# Patient Record
Sex: FEMALE | Race: WHITE | NOT HISPANIC OR LATINO | Employment: UNEMPLOYED | URBAN - METROPOLITAN AREA
[De-identification: names, ages, dates, MRNs, and addresses within clinical notes are randomized per-mention and may not be internally consistent; named-entity substitution may affect disease eponyms.]

---

## 2023-10-02 PROBLEM — D22.39 MELANOCYTIC NEVI OF OTHER PARTS OF FACE: Status: ACTIVE | Noted: 2019-07-08

## 2023-10-02 PROBLEM — Z85.72 HISTORY OF NON-HODGKIN'S LYMPHOMA: Status: ACTIVE | Noted: 2023-10-02

## 2023-10-02 PROBLEM — I70.0 ATHEROSCLEROSIS OF AORTA (CMS-HCC): Status: ACTIVE | Noted: 2023-10-02

## 2023-10-02 PROBLEM — S76.301A RIGHT HAMSTRING INJURY: Status: ACTIVE | Noted: 2023-10-02

## 2023-10-02 PROBLEM — F51.04 PSYCHOPHYSIOLOGICAL INSOMNIA: Status: ACTIVE | Noted: 2023-10-02

## 2023-10-02 PROBLEM — F41.9 ANXIETY AND DEPRESSION: Status: ACTIVE | Noted: 2023-10-02

## 2023-10-02 PROBLEM — R05.3 CHRONIC COUGH: Status: ACTIVE | Noted: 2023-10-02

## 2023-10-02 PROBLEM — F32.A ANXIETY AND DEPRESSION: Status: ACTIVE | Noted: 2023-10-02

## 2023-10-02 PROBLEM — D22.5 MELANOCYTIC NEVI OF TRUNK: Status: ACTIVE | Noted: 2019-07-08

## 2023-10-02 PROBLEM — M62.838 MUSCLE SPASM: Status: ACTIVE | Noted: 2023-10-02

## 2023-10-02 PROBLEM — R50.9 FEVER: Status: ACTIVE | Noted: 2023-10-02

## 2023-10-02 PROBLEM — I25.10 CORONARY ARTERY CALCIFICATION SEEN ON CT SCAN: Status: ACTIVE | Noted: 2023-10-02

## 2023-10-02 PROBLEM — H61.21 HEARING LOSS DUE TO CERUMEN IMPACTION, RIGHT: Status: ACTIVE | Noted: 2023-10-02

## 2023-10-02 PROBLEM — R03.0 ELEVATED BLOOD PRESSURE READING: Status: ACTIVE | Noted: 2023-10-02

## 2023-10-02 PROBLEM — I35.0 AORTIC VALVE STENOSIS: Status: ACTIVE | Noted: 2023-10-02

## 2023-10-02 PROBLEM — L65.9 NONSCARRING HAIR LOSS, UNSPECIFIED: Status: ACTIVE | Noted: 2019-07-08

## 2023-10-02 PROBLEM — Z15.89 MTHFR MUTATION (METHYLENETETRAHYDROFOLATE REDUCTASE): Status: ACTIVE | Noted: 2023-10-02

## 2023-10-02 PROBLEM — S90.31XA CONTUSION OF RIGHT FOOT: Status: ACTIVE | Noted: 2023-10-02

## 2023-10-02 PROBLEM — Q24.8 LEFT VENTRICULAR OUTFLOW TRACT OBSTRUCTION (HHS-HCC): Status: ACTIVE | Noted: 2023-10-02

## 2023-10-02 PROBLEM — M72.2 PLANTAR FASCIITIS: Status: ACTIVE | Noted: 2023-10-02

## 2023-10-02 PROBLEM — D84.9 IMMUNE DEFICIENCY DISORDER (MULTI): Status: ACTIVE | Noted: 2023-10-02

## 2023-10-02 PROBLEM — R01.1 CARDIAC MURMUR: Status: ACTIVE | Noted: 2023-10-02

## 2023-10-02 PROBLEM — J18.9 PNEUMONIA OF LEFT LOWER LOBE DUE TO INFECTIOUS ORGANISM: Status: ACTIVE | Noted: 2023-10-02

## 2023-10-02 PROBLEM — D18.01 HEMANGIOMA OF SKIN AND SUBCUTANEOUS TISSUE: Status: ACTIVE | Noted: 2019-07-08

## 2023-10-02 PROBLEM — M41.9 SCOLIOSIS: Status: ACTIVE | Noted: 2023-10-02

## 2023-10-02 RX ORDER — L. ACIDOPHILUS/L.BULGARICUS 1MM CELL
TABLET ORAL
COMMUNITY
Start: 2022-12-12

## 2023-10-02 RX ORDER — CALCIUM CARBONATE/VITAMIN D3 500-10/5ML
1 LIQUID (ML) ORAL DAILY
COMMUNITY
Start: 2019-02-01

## 2023-10-02 RX ORDER — CHOLECALCIFEROL (VITAMIN D3) 125 MCG
CAPSULE ORAL
COMMUNITY
Start: 2019-02-01

## 2023-10-02 RX ORDER — ASPIRIN 81 MG/1
TABLET ORAL
COMMUNITY
Start: 2022-12-12 | End: 2023-11-15 | Stop reason: ALTCHOICE

## 2023-10-02 RX ORDER — GLUCOSAM/CHONDRO/HERB 149/HYAL 750-100 MG
1 TABLET ORAL DAILY
COMMUNITY

## 2023-10-02 RX ORDER — PHENOL 1.4 %
1 AEROSOL, SPRAY (ML) MUCOUS MEMBRANE DAILY
COMMUNITY
Start: 2019-02-01

## 2023-10-02 RX ORDER — TURMERIC ROOT EXTRACT 500 MG
TABLET ORAL
COMMUNITY
Start: 2022-12-12

## 2023-10-02 RX ORDER — ASCORBIC ACID 500 MG
1 TABLET ORAL DAILY
COMMUNITY
Start: 2019-02-01

## 2023-10-02 RX ORDER — MIRTAZAPINE 7.5 MG/1
3 TABLET, FILM COATED ORAL NIGHTLY
COMMUNITY
Start: 2023-09-06 | End: 2023-10-22 | Stop reason: SINTOL

## 2023-10-02 RX ORDER — ZINC GLUCONATE 50 MG
50 TABLET ORAL DAILY
COMMUNITY
Start: 2019-02-01

## 2023-10-02 RX ORDER — VIT C/E/ZN/COPPR/LUTEIN/ZEAXAN 250MG-90MG
1 CAPSULE ORAL DAILY
COMMUNITY

## 2023-10-02 RX ORDER — ACETAMINOPHEN 160 MG/5ML
SUSPENSION, ORAL (FINAL DOSE FORM) ORAL
COMMUNITY
Start: 2019-02-01

## 2023-10-02 RX ORDER — LEVOMEFOLATE CALCIUM 15 MG
TABLET ORAL DAILY
COMMUNITY
Start: 2019-02-01

## 2023-10-02 RX ORDER — CHOLECALCIFEROL (VITAMIN D3) 25 MCG
TABLET,CHEWABLE ORAL DAILY
COMMUNITY
Start: 2019-02-01

## 2023-10-03 ENCOUNTER — APPOINTMENT (OUTPATIENT)
Dept: ORTHOPEDIC SURGERY | Facility: HOSPITAL | Age: 68
End: 2023-10-03
Payer: COMMERCIAL

## 2023-10-04 ENCOUNTER — APPOINTMENT (OUTPATIENT)
Dept: BEHAVIORAL HEALTH | Facility: CLINIC | Age: 68
End: 2023-10-04
Payer: COMMERCIAL

## 2023-10-06 ENCOUNTER — ALLIED HEALTH (OUTPATIENT)
Dept: INTEGRATIVE MEDICINE | Facility: CLINIC | Age: 68
End: 2023-10-06
Payer: COMMERCIAL

## 2023-10-06 DIAGNOSIS — M99.02 SOMATIC DYSFUNCTION OF THORACIC REGION: ICD-10-CM

## 2023-10-06 DIAGNOSIS — M99.03 SOMATIC DYSFUNCTION OF LUMBAR REGION: Primary | ICD-10-CM

## 2023-10-06 DIAGNOSIS — M72.2 PLANTAR FASCIITIS, BILATERAL: ICD-10-CM

## 2023-10-06 DIAGNOSIS — M99.01 SOMATIC DYSFUNCTION OF CERVICAL REGION: ICD-10-CM

## 2023-10-06 DIAGNOSIS — M79.18 MYALGIA, OTHER SITE: ICD-10-CM

## 2023-10-06 DIAGNOSIS — M54.2 CERVICALGIA: ICD-10-CM

## 2023-10-06 DIAGNOSIS — M54.16 LUMBAR RADICULOPATHY: ICD-10-CM

## 2023-10-06 DIAGNOSIS — M99.04 SACROILIAC JOINT SOMATIC DYSFUNCTION: ICD-10-CM

## 2023-10-06 PROCEDURE — 99204 OFFICE O/P NEW MOD 45 MIN: CPT | Performed by: CHIROPRACTOR

## 2023-10-06 PROCEDURE — 97140 MANUAL THERAPY 1/> REGIONS: CPT | Performed by: CHIROPRACTOR

## 2023-10-06 PROCEDURE — 98941 CHIROPRACT MANJ 3-4 REGIONS: CPT | Performed by: CHIROPRACTOR

## 2023-10-06 ASSESSMENT — ENCOUNTER SYMPTOMS
CHEST TIGHTNESS: 0
CONFUSION: 0
FATIGUE: 0
FREQUENCY: 0
DIARRHEA: 0
JOINT SWELLING: 0
CONSTIPATION: 0
TROUBLE SWALLOWING: 0
ABDOMINAL PAIN: 0
FEVER: 0

## 2023-10-06 NOTE — PROGRESS NOTES
Subjective   Patient ID: Whitley Cohn is a 68 y.o. female who presents today for full spine complaints, with emphasis on low back, as well as foot pain.    1/25 [facility]    HPI -the patient is presenting today for widespread complaints, with emphasis on her lumbar spine as well as bilateral foot pain.  Her foot pain has been present for about 6 months and has been previously diagnosed with plantar fasciitis affecting the right foot.  This has led to a drop in her activity and she feels like this has led to the cascade of events affecting other parts of her body.  About a week ago she noticed numbness in the left lower leg and foot.  She attempted to go to an orthopedic urgent care but was advised to wait to see if her symptoms self resolved by one of the staff.  She then received chiropractic visit and experience relief related to her left lower extremity numbness for about 1 day.  Symptoms then returned and remained present.  She also expresses that the left lower leg and foot feel very tight.  She feels like there is tension and a knot in the left gluteal muscles.  Her right plantar fascia is very tender and painful. And points to the distal calcaneus as the spot of pain today.  But she does note that this can change.  She is currently doing physical therapy for her plantar fasciitis.   With these additional symptoms in the feet as well as in the lumbar spine, she now experiences extra tension into the neck and upper back and reports additional clicking in the cervical spine with rotating the neck to the right.  Due to the pain she has been less active and has lost significant mount of weight due to this as well as a general lack of appetite secondary to pain.    She has had episodes of plantar fasciitis in the past as well as lower back pain.  But she reports that this current episode is more intense than previous.    Review of Systems   Constitutional:  Negative for fatigue and fever.   HENT:  Negative for trouble  swallowing.    Eyes:  Negative for visual disturbance.   Respiratory:  Negative for chest tightness.    Cardiovascular:  Negative for chest pain and leg swelling.   Gastrointestinal:  Negative for abdominal pain, constipation and diarrhea.   Genitourinary:  Negative for frequency.   Musculoskeletal:  Negative for joint swelling.   Neurological:  Negative for syncope.   Psychiatric/Behavioral:  Negative for confusion.    All other systems reviewed and are negative.    Objective     The patient is alert and oriented x 3. She is in mild to moderate distress.  FHC.  Cranial nerves II-XII are grossly intact. Mil difficulty with transitional movements is observed.  Increased thoracic kyphosis. Elevated left iliac crest.  Leg length is measured at 86 cm on the right and 87 cm on the left.  Presence of levoscoliosis in lumbar spine. Gait is altered due to pain.     Moderate to severe hypertonicity and tenderness is present in the cervical paraspinals, upper trapezius, rhomboids, thoracic paraspinals, lumbar paraspinals, upper gluteals, piriformis, hamstrings, gastrocnemius, plantar fascia bilaterally.  Lumbar and gluteal findings are greater on the left.  Lower leg findings are slightly greater on the left with the exception of the plantar fascia findings.    Segmental joint dysfunction was assessed with motion palpation and is identified in the following areas:  Cervical: C6/C7  Thoracic: T3/4, T4/5  Lumbopelvic: L4/5, L5/S1, Right SI (PI), Left SI      Lumbar spine range of motion is reduced by 20% and painful and extension and left sidebending.      Lower extremity reflexes are diffusely graded 1+.    Kemps test is provocative bilaterally.  Yeomans test is provocative bilaterally.  Nachlas test is negative bilaterally.    Assessment/Plan   The initial presentation revealed several factors contributing to her pain syndrome.  There is likely degenerative changes and the lumbar spine alongside somatic dysfunction of the  lumbar spine and sacroiliac joints contributing to her low back pain and the left lumbar radiculopathy.  She is also dealing with plantar fasciitis bilaterally, noticed more so on the right.  Her leg length inequality is certainly a complicating factor and we will remeasure her legs on subsequent visits to ensure accuracy.  The levoscoliosis present in the lumbar spine is also a complicating factor.  We will implement a trial of care to include various manual therapy techniques based on the patient's tolerance, we will also utilize joint manipulation to tolerance.  When considering her widespread complaints I believe that consult with integrative medicine is warranted and she is already scheduled this.  She may also benefit from podiatric consultation as relates to leg length inequality and the potential use of foot orthoses.    Today's treatment:  Drop-table manipulation applied to the sacroiliac joints.   Low force diversified manipulation applied to the involved thoracic and lumbar segments, and sacroiliac joints.  Low force manipulation to the involved cervical, thoracic and lumbar segments, and sacroiliac joints.      Active release technique applied to the plantar fascia bilaterally.  Mobilization applied to the metatarsals of the left foot.  Mobilization applied to the calcaneus of the right foot.  Manual stretching techniques applied to the hip musculature.  Start time for manual therapy was 3:15 pm and ending time was 3:30 pm    Patient also advised on hip stretching as well as other pertinent lumbar flexibility exercises.  She was also advised on Achilles and gastrocnemius stretching.    Treatment Plan:   The patient and I discussed the risks and benefits of chiropractic care. Based on the patient's subjective complaints along with the examination findings, it is advised that a course of chiropractic treatment be initiated. We will utilize joint manipulation to tolerance.  Manual therapy/Neuromuscular  Re-education will be applied in the form of Integrative Dry Needling, Active Release Techniques to tolerance. Will see patient at a frequency of one visit per week for 5 visits (Visit 1/6).  Consent for care was given both written and orally by the patient. They were advised on the potential for post-treatment soreness. The patient tolerated today's treatment with little or no additional discomfort and was instructed to contact the office for questions or concerns. We will closely monitor their response to care to determine if any changes need to occur, if advanced imaging is needed, or if referral to other providers is appropriate.     This chart note was generated using dictation software, and as such, there may be typographical errors present. Abbreviations may include CS for cervical spine, TS for thoracic spine, and LS for lumbar spine.

## 2023-10-13 ENCOUNTER — APPOINTMENT (OUTPATIENT)
Dept: INTEGRATIVE MEDICINE | Facility: CLINIC | Age: 68
End: 2023-10-13
Payer: COMMERCIAL

## 2023-10-16 ENCOUNTER — TELEPHONE (OUTPATIENT)
Dept: BEHAVIORAL HEALTH | Facility: CLINIC | Age: 68
End: 2023-10-16
Payer: COMMERCIAL

## 2023-10-20 ENCOUNTER — TELEPHONE (OUTPATIENT)
Dept: BEHAVIORAL HEALTH | Facility: CLINIC | Age: 68
End: 2023-10-20
Payer: COMMERCIAL

## 2023-10-20 NOTE — TELEPHONE ENCOUNTER
The answering service received a page from the patient at 4:40pm. The patient said that she had been started on mirtazapine 15mg by Dr. Balbuena for panic attacks and poor sleep. She took 15mg for 3 days starting on 10/16 and then 7.5mg last night. Within 24 hrs of taking the first dose she started to experience side effects such as tongue burning, increased agitation, and nausea. She slept poorly last night and had nightmares which are unusual for her. She endorses mild abdominal pain but denies vomiting or fever. Denies chest pain, SOB, heart palpitations, dizziness, headaches.     She was wondering if it would be okay to discontinue the mirtazapine. I advised her that since she has only been on it for 3 days it would be okay to discontinue tonight. I had spoken to the patient before (in September) when she was tapering off mirtazapine. At that time she was concerned that she had tapered off the mirtazapine too quickly. The patient says she had been off of mirtazapine for a couple of weeks since the beginning of October. Then about one week ago her PCP started her on amitriptyline 25mg for a few days which she has since discontinued.     The patient asked what she should do for the rest of the weekend about her depression and anxiety. She inquired about natural supplements. She is already taking Vitamin D and has tried melatonin in the past with little effect. I advised her that I cannot advise her on natural supplements. I advised her to try stretching, breathing exercises, and outdoor exercise for her mood. I told her I would message Dr. Mullen and Dr. Balbuena about her concerns.

## 2023-10-23 ENCOUNTER — TELEPHONE (OUTPATIENT)
Dept: BEHAVIORAL HEALTH | Facility: CLINIC | Age: 68
End: 2023-10-23

## 2023-10-23 ENCOUNTER — TELEPHONE (OUTPATIENT)
Dept: BEHAVIORAL HEALTH | Facility: CLINIC | Age: 68
End: 2023-10-23
Payer: COMMERCIAL

## 2023-10-23 ENCOUNTER — TELEPHONE (OUTPATIENT)
Dept: OTHER | Age: 68
End: 2023-10-23
Payer: COMMERCIAL

## 2023-10-23 ENCOUNTER — TELEPHONE VISIT (OUTPATIENT)
Dept: BEHAVIORAL HEALTH | Facility: CLINIC | Age: 68
End: 2023-10-23
Payer: COMMERCIAL

## 2023-10-23 DIAGNOSIS — G47.00 INSOMNIA, UNSPECIFIED TYPE: ICD-10-CM

## 2023-10-23 PROCEDURE — 99024 POSTOP FOLLOW-UP VISIT: CPT | Performed by: PSYCHIATRY & NEUROLOGY

## 2023-10-23 NOTE — TELEPHONE ENCOUNTER
Spoke with patient , after 2 other covering providers have spoken with her, regarding sleep/anxiety  concerns. She has decided to stop remeron 15 mg and now wonders if she stopped it too quickly. I have suggested she try to resume remeron at a low dose of 7..5 mg at at night and evaluate for sleep improvement (we did review she complained of SE from mirtazepine in the past but states she thinks she stopped it too quickly). She asks about OTC sleep meds/supplements which I do not suggest at this time. Dr. Mullen recently reviewed melatonin use with her.   She has a psychotherapist and it has been suggested that she see sleep medicine.   I have suggested she discuss further with Dr. Mullen once he returns from vacation.   She is advised that if she needs urgent assistance or feels worse she should report to the emergency room.

## 2023-10-23 NOTE — TELEPHONE ENCOUNTER
Dr. Mullen pt - having trouble while he is on vacation.  Pt talked to Dr. Balbuena yesterday and insists on talking to you today.  She is requesting call back to   [phone]

## 2023-10-23 NOTE — PROGRESS NOTES
Talked with the patient and her  yesterday. She did not tolerate Mirtazapine, so we stopped it. Unable to sleep. Pain seems to be the main trigger. No thoughts of suicide.   Advised to discuss with her PCP pain management, discussed relaxation, medication and sleep hygiene. Also recommended emergency help if any thoughts of suicide and to keep her appointment with Dr Mullen

## 2023-11-06 PROBLEM — M72.2 PLANTAR FASCIAL FIBROMATOSIS: Status: ACTIVE | Noted: 2023-09-27

## 2023-11-06 PROBLEM — F41.9 ANXIETY DISORDER: Status: ACTIVE | Noted: 2023-07-14

## 2023-11-06 PROBLEM — E66.3 OVERWEIGHT: Status: ACTIVE | Noted: 2023-09-27

## 2023-11-06 PROBLEM — Z86.79 H/O: HYPERTENSION: Status: ACTIVE | Noted: 2023-09-27

## 2023-11-06 PROBLEM — C85.90 NON-HODGKIN'S LYMPHOMA (MULTI): Status: ACTIVE | Noted: 2023-07-14

## 2023-11-06 PROBLEM — E66.3 OVERWEIGHT WITH BODY MASS INDEX (BMI) 25.0-29.9: Status: ACTIVE | Noted: 2023-09-27

## 2023-11-06 RX ORDER — DOXYCYCLINE 100 MG/1
TABLET ORAL
COMMUNITY
End: 2023-11-15 | Stop reason: ALTCHOICE

## 2023-11-06 RX ORDER — AMITRIPTYLINE HYDROCHLORIDE 25 MG/1
TABLET, FILM COATED ORAL
COMMUNITY
End: 2023-11-15 | Stop reason: ALTCHOICE

## 2023-11-06 RX ORDER — HYDROXYZINE HYDROCHLORIDE 25 MG/1
TABLET, FILM COATED ORAL
COMMUNITY
End: 2023-11-15 | Stop reason: ALTCHOICE

## 2023-11-06 RX ORDER — TRAMADOL HYDROCHLORIDE 50 MG/1
TABLET ORAL
COMMUNITY
End: 2023-11-15 | Stop reason: ALTCHOICE

## 2023-11-06 RX ORDER — ERYTHROMYCIN 5 MG/G
OINTMENT OPHTHALMIC
COMMUNITY
End: 2023-11-15 | Stop reason: ALTCHOICE

## 2023-11-06 RX ORDER — TRAZODONE HYDROCHLORIDE 50 MG/1
TABLET ORAL
COMMUNITY
End: 2023-11-15 | Stop reason: ALTCHOICE

## 2023-11-06 RX ORDER — MIRTAZAPINE 15 MG/1
TABLET, FILM COATED ORAL
COMMUNITY
Start: 2023-07-24 | End: 2023-11-15 | Stop reason: ALTCHOICE

## 2023-11-06 RX ORDER — BUSPIRONE HYDROCHLORIDE 10 MG/1
TABLET ORAL
COMMUNITY
Start: 2023-07-12 | End: 2023-11-15 | Stop reason: ALTCHOICE

## 2023-11-06 RX ORDER — BUSPIRONE HYDROCHLORIDE 15 MG/1
15 TABLET ORAL 3 TIMES DAILY PRN
COMMUNITY
Start: 2023-06-22 | End: 2023-11-15 | Stop reason: ALTCHOICE

## 2023-11-06 RX ORDER — MIRTAZAPINE 45 MG/1
45 TABLET, FILM COATED ORAL NIGHTLY PRN
COMMUNITY
Start: 2023-06-22 | End: 2023-11-15 | Stop reason: ALTCHOICE

## 2023-11-06 RX ORDER — METHYLPREDNISOLONE 4 MG/1
TABLET ORAL
COMMUNITY
Start: 2023-07-17 | End: 2023-11-15 | Stop reason: ALTCHOICE

## 2023-11-06 RX ORDER — AMOXICILLIN 500 MG/1
2000 TABLET, FILM COATED ORAL ONCE AS NEEDED
COMMUNITY
Start: 2023-03-19 | End: 2023-11-15 | Stop reason: ALTCHOICE

## 2023-11-06 RX ORDER — BUSPIRONE HYDROCHLORIDE 5 MG/1
TABLET ORAL
COMMUNITY
Start: 2023-07-06 | End: 2023-11-15 | Stop reason: ALTCHOICE

## 2023-11-06 RX ORDER — ACYCLOVIR 50 MG/G
OINTMENT TOPICAL
COMMUNITY
Start: 2023-10-20 | End: 2023-11-15 | Stop reason: ALTCHOICE

## 2023-11-13 ENCOUNTER — OFFICE VISIT (OUTPATIENT)
Dept: BEHAVIORAL HEALTH | Facility: CLINIC | Age: 68
End: 2023-11-13
Payer: COMMERCIAL

## 2023-11-13 DIAGNOSIS — F32.A ANXIETY AND DEPRESSION: ICD-10-CM

## 2023-11-13 DIAGNOSIS — F41.9 ANXIETY AND DEPRESSION: ICD-10-CM

## 2023-11-13 PROCEDURE — 99215 OFFICE O/P EST HI 40 MIN: CPT | Performed by: PSYCHIATRY & NEUROLOGY

## 2023-11-13 PROCEDURE — 1125F AMNT PAIN NOTED PAIN PRSNT: CPT | Performed by: PSYCHIATRY & NEUROLOGY

## 2023-11-13 RX ORDER — MIRTAZAPINE 15 MG/1
15 TABLET, FILM COATED ORAL NIGHTLY
Qty: 30 TABLET | Refills: 0 | Status: SHIPPED | OUTPATIENT
Start: 2023-11-13 | End: 2023-11-15 | Stop reason: ALTCHOICE

## 2023-11-13 ASSESSMENT — ENCOUNTER SYMPTOMS: DYSPHORIC MOOD: 1

## 2023-11-13 NOTE — PROGRESS NOTES
Subjective   Patient ID: Whitley Cohn is a 68 y.o. female who presents for No chief complaint on file. Some days I have been okay and some days I have not been okay.    The patient presents alert, fully oriented, recent and remote memory are intact and with language intact. The patient denies any suicidal or homicidal ideation or plans. The patient continues with chronic depression and anxiety. No manic features are evidenced. No hallucinations or paranoid symptoms evidenced. The patient has been having more complications with foot and now has a pinched nerve which she feels has set her back.         Review of Systems   Neurological:         The patient presents alert, fully oriented, recent and remote memory are intact and with language intact. The patient denies any suicidal or homicidal ideation or plans. The patient continues with chronic depression and anxiety. No manic features are evidenced. No hallucinations or paranoid symptoms evidenced. The patient has been having more complications with foot and now has a pinched nerve which she feels has set her back.      Psychiatric/Behavioral:  Positive for dysphoric mood.         The patient presents alert, fully oriented, recent and remote memory are intact and with language intact. The patient denies any suicidal or homicidal ideation or plans. The patient continues with chronic depression and anxiety. No manic features are evidenced. No hallucinations or paranoid symptoms evidenced. The patient has been having more complications with foot and now has a pinched nerve which she feels has set her back.      All other systems reviewed and are negative.      Objective   Physical Exam  Neurological:      General: No focal deficit present.      Mental Status: She is alert and oriented to person, place, and time. Mental status is at baseline.      Comments: The patient presents alert, fully oriented, recent and remote memory are intact and with language intact. The  patient denies any suicidal or homicidal ideation or plans. The patient continues with chronic depression and anxiety. No manic features are evidenced. No hallucinations or paranoid symptoms evidenced. The patient has been having more complications with foot and now has a pinched nerve which she feels has set her back.      Psychiatric:      Comments: The patient presents alert, fully oriented, recent and remote memory are intact and with language intact. The patient denies any suicidal or homicidal ideation or plans. The patient continues with chronic depression and anxiety. No manic features are evidenced. No hallucinations or paranoid symptoms evidenced. The patient has been having more complications with foot and now has a pinched nerve which she feels has set her back.            Lab Review:       Assessment/Plan   The risks, benefits & alternatives to the medications prescribed were explained to you and your support person, your , today. You & your  were able to understand & repeat these risks, benefits & alternatives to this prescribed medication. You and your support persons have agreed to proceed with treatment with the medications discussed based on the conclusion that the benefit outweighs the risks of this treatment regimen: start mirtazapine 15 mg daily at bedtime. Continue with your therapist and follow up in one to two weeks.

## 2023-11-15 ENCOUNTER — PATIENT MESSAGE (OUTPATIENT)
Dept: INTEGRATIVE MEDICINE | Facility: CLINIC | Age: 68
End: 2023-11-15

## 2023-11-15 ENCOUNTER — ALLIED HEALTH (OUTPATIENT)
Dept: INTEGRATIVE MEDICINE | Facility: CLINIC | Age: 68
End: 2023-11-15
Payer: COMMERCIAL

## 2023-11-15 VITALS
SYSTOLIC BLOOD PRESSURE: 132 MMHG | WEIGHT: 153 LBS | BODY MASS INDEX: 24.69 KG/M2 | HEART RATE: 73 BPM | OXYGEN SATURATION: 96 % | DIASTOLIC BLOOD PRESSURE: 79 MMHG

## 2023-11-15 DIAGNOSIS — M72.2 PLANTAR FASCIITIS, BILATERAL: ICD-10-CM

## 2023-11-15 DIAGNOSIS — M54.16 LUMBAR RADICULOPATHY: Primary | ICD-10-CM

## 2023-11-15 DIAGNOSIS — M54.59 OTHER LOW BACK PAIN: ICD-10-CM

## 2023-11-15 DIAGNOSIS — F41.9 ANXIETY AND DEPRESSION: ICD-10-CM

## 2023-11-15 DIAGNOSIS — F32.A ANXIETY AND DEPRESSION: ICD-10-CM

## 2023-11-15 DIAGNOSIS — F51.04 PSYCHOPHYSIOLOGICAL INSOMNIA: ICD-10-CM

## 2023-11-15 DIAGNOSIS — M72.2 PLANTAR FASCIITIS OF RIGHT FOOT: ICD-10-CM

## 2023-11-15 SDOH — SOCIAL STABILITY: SOCIAL NETWORK: I HAVE TROUBLE DOING ALL OF THE FAMILY ACTIVITIES THAT I WANT TO DO: ALWAYS

## 2023-11-15 SDOH — SOCIAL STABILITY: SOCIAL NETWORK: I HAVE TROUBLE DOING ALL OF THE ACTIVITIES WITH FRIENDS THAT I WANT TO DO: ALWAYS

## 2023-11-15 SDOH — SOCIAL STABILITY: SOCIAL NETWORK: PROMIS ABILITY TO PARTICIPATE IN SOCIAL ROLES & ACTIVITIES T-SCORE: 34

## 2023-11-15 SDOH — SOCIAL STABILITY: SOCIAL NETWORK: I HAVE TROUBLE DOING ALL OF MY REGULAR LEISURE ACTIVITIES WITH OTHERS: ALWAYS

## 2023-11-15 SDOH — SOCIAL STABILITY: SOCIAL NETWORK

## 2023-11-15 SDOH — SOCIAL STABILITY: SOCIAL NETWORK: I HAVE TROUBLE DOING ALL OF MY USUAL WORK (INCLUDE WORK AT HOME): USUALLY

## 2023-11-15 ASSESSMENT — PROMIS GLOBAL HEALTH SCALE
CARRYOUT_SOCIAL_ACTIVITIES: GOOD
RATE_AVERAGE_FATIGUE: VERY SEVERE
RATE_GENERAL_HEALTH: EXCELLENT
RATE_MENTAL_HEALTH: VERY GOOD
RATE_PHYSICAL_HEALTH: VERY GOOD
RATE_QUALITY_OF_LIFE: EXCELLENT
RATE_AVERAGE_PAIN: 8
EMOTIONAL_PROBLEMS: OFTEN
RATE_SOCIAL_SATISFACTION: VERY GOOD
CARRYOUT_PHYSICAL_ACTIVITIES: A LITTLE

## 2023-11-15 ASSESSMENT — ENCOUNTER SYMPTOMS
SHORTNESS OF BREATH: 0
WEAKNESS: 0
DYSURIA: 0
SLEEP DISTURBANCE: 1
COUGH: 0
NERVOUS/ANXIOUS: 1
BACK PAIN: 1
FATIGUE: 0
APPETITE CHANGE: 0
HEADACHES: 0
DECREASED CONCENTRATION: 1
DIFFICULTY URINATING: 0
FEVER: 0
STRESS: 1
ARTHRALGIAS: 1
MYALGIAS: 0

## 2023-11-15 NOTE — ASSESSMENT & PLAN NOTE
Suggest that she increase her vitamin d dose  Strongly encouraged her to restart the mirtazapin at bedtime at least for the next several months while we get her mood in better control so that she can work on lifestyle factors. She is not sleeping, may need to do sleep study at some point.   She is on a VERY high dose of l-methylfolate and sometimes this can dause insomnia. Suggest decrease this to 1 mg per day in the setting a good multiple vitamin. Gave her a recommendation.

## 2023-11-15 NOTE — PATIENT INSTRUCTIONS
Try your best to go to the pool two times per week.   Suggest see Christina Griffin, PT for your back pain and plantar fascitis.   Increase vitamin d3 4000 international units    Change L methylfolate to 5 mg with b complex.   Jessica mag glycinate 330 mg at bed time. If not helping with sleep, would increase to 440 mg at bed time  Roll your feet for 30seconds each to start and then work up to at least a minute.   Try acupuncture for the plantar fascitis.   Crocodile pose for ten minutes once per day.   Look into another place to swim daily.   Stop l methylfolate until new dose comes.

## 2023-11-15 NOTE — PROGRESS NOTES
Integrative Medicine Visit:     Subjective   Patient ID: Whitley Cohn is a 68 y.o. female who presents for Stress and low immunity       Stress  Associated symptoms include arthralgias. Pertinent negatives include no chest pain, congestion, coughing, fatigue, fever, headaches, myalgias, rash or weakness.     She is very stressed and in physical and emotional pain.  Struggling with her depresison and anxiety. Working with psychiatry. Began in mid June.  Also having some physical injuries that are related to her stress. Not taking remeron at this time.   Before mid June, she likes to exercise a lot. Walked five miles per day and doing rachelle and in [place] many stressful things happened all at the same time.  It affected her emotionally. She thinks she was holding her muscles tense but cannot walk due to the plantar fasciitis. Now she is healing from the injuries. Has had some help with physical therapy. It got worse with this. Left foot numbness which she was told is from stenosis in her lower back. Has pain in right foot. Hx of scoliosis. Went to PT to [facility]. Went to a chiropractor which made the pain better.   Pinched nerve started October 5 and the plantar fascitis began in July. On a very limited budget.   Has pain in her right foot that is 7/10.  Some back pain which is from lack of exercise.     Has pain in her low back which she thinks causes left foot numbness. It is sore 5/10. It varies from being on the right and the left side.     Depression: tried medication but it was too worried about side effects and did not feel better. Tried mirtazipine for about one month. Not taking. Was recommended to restart this two days ago. Take l methylfolate (methylpro). Lots of family stress. It really upsets her. Depression started in June.   Taking vitamin d 2000 international units  by mouth daily.   Takes magnesium citrate 330 mg in the morning. Takes mag glycinate about 110 mg at bedtime.         CONCERNS:  wants help with her plantar fascitis. Depression, back pain. Insomnia.     PMH:  non hodgkin's lymphoma. In remission.  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  Moved to [facility] 8 years ago to help grand children. Lives with her .     NUTRITION: stopped coffee.     Smoking:  non-smoker    Alcohol use:  none    Exercise:   swim two times per week.     SLEEP: not sleeping well. Between 1-5 hours per day.     STRESS MANAGEMENT: no specific practices now  SUPPORT:     Review of Systems   Constitutional:  Negative for appetite change, fatigue and fever.   HENT:  Negative for congestion and hearing loss.    Eyes:  Negative for visual disturbance.   Respiratory:  Negative for cough and shortness of breath.    Cardiovascular:  Negative for chest pain and leg swelling.   Genitourinary:  Negative for difficulty urinating, dysuria and urgency.   Musculoskeletal:  Positive for arthralgias and back pain. Negative for myalgias.   Skin:  Negative for rash.   Neurological:  Negative for weakness and headaches.   Psychiatric/Behavioral:  Positive for decreased concentration and sleep disturbance. Negative for behavioral problems. The patient is nervous/anxious.         + depressed            Pain:    Objective   /79 (BP Location: Left arm, Patient Position: Sitting, BP Cuff Size: Adult)   Pulse 73   Wt 69.4 kg (153 lb)   SpO2 96%   BMI 24.69 kg/m²       Physical Exam  HENT:      Head: Normocephalic and atraumatic.      Mouth/Throat:      Mouth: Mucous membranes are moist.   Cardiovascular:      Rate and Rhythm: Normal rate.   Pulmonary:      Effort: Pulmonary effort is normal. No respiratory distress.   Musculoskeletal:         General: Deformity (toes very curled in) present. No swelling.      Cervical back: Normal range of motion.   Skin:      General: Skin is dry.      Findings: No rash.   Neurological:      General: No focal deficit present.      Mental Status: She is alert and oriented to person, place, and time.   Psychiatric:      Comments: Flat affect but at the same time appears anxious. Not tearful.                       Assessment/Plan     Problem List Items Addressed This Visit             ICD-10-CM    Anxiety and depression F41.9, F32.A     Suggest that she increase her vitamin d dose  Strongly encouraged her to restart the mirtazapin at bedtime at least for the next several months while we get her mood in better control so that she can work on lifestyle factors. She is not sleeping, may need to do sleep study at some point.   She is on a VERY high dose of l-methylfolate and sometimes this can dause insomnia. Suggest decrease this to 1 mg per day in the setting a good multiple vitamin. Gave her a recommendation.            Psychophysiological insomnia F51.04     Restart mirtazapine and continue follow up with psychiatry.            Lumbar radiculopathy - Primary M54.16     Acupuncture  PT         Relevant Orders    Referral to Physical Therapy    Plantar fasciitis, bilateral M72.2     See PT  Advised soft ball rolling under the foot           Relevant Orders    Referral to [employer]     Other Visit Diagnoses         Codes    Plantar fasciitis of right foot     M72.2    Relevant Orders    Referral to Physical Therapy    Other low back pain     M54.59    Relevant Orders    [facility]                Recommend Follow up in : 2 weeks.     Aleena Cano MD PhD

## 2023-11-16 PROBLEM — M51.369 DEGENERATION OF LUMBAR INTERVERTEBRAL DISC: Status: ACTIVE | Noted: 2023-10-13

## 2023-11-16 PROBLEM — M51.36 DEGENERATION OF LUMBAR INTERVERTEBRAL DISC: Status: ACTIVE | Noted: 2023-10-13

## 2023-11-16 RX ORDER — MIRTAZAPINE 7.5 MG/1
7.5 TABLET, FILM COATED ORAL NIGHTLY
COMMUNITY
Start: 2023-10-23 | End: 2023-11-22 | Stop reason: SDUPTHER

## 2023-11-22 ENCOUNTER — TELEPHONE (OUTPATIENT)
Dept: OTHER | Age: 68
End: 2023-11-22
Payer: COMMERCIAL

## 2023-11-22 ENCOUNTER — TELEMEDICINE (OUTPATIENT)
Dept: BEHAVIORAL HEALTH | Facility: CLINIC | Age: 68
End: 2023-11-22
Payer: COMMERCIAL

## 2023-11-22 DIAGNOSIS — F41.8 ANXIETY WITH OBSESSIONAL FEATURES: ICD-10-CM

## 2023-11-22 DIAGNOSIS — F32.A ANXIETY AND DEPRESSION: ICD-10-CM

## 2023-11-22 DIAGNOSIS — F43.23 ADJUSTMENT DISORDER WITH MIXED ANXIETY AND DEPRESSED MOOD: ICD-10-CM

## 2023-11-22 DIAGNOSIS — F41.9 ANXIETY AND DEPRESSION: ICD-10-CM

## 2023-11-22 PROCEDURE — 99215 OFFICE O/P EST HI 40 MIN: CPT | Performed by: PSYCHIATRY & NEUROLOGY

## 2023-11-22 RX ORDER — MIRTAZAPINE 15 MG/1
15 TABLET, FILM COATED ORAL NIGHTLY
Qty: 30 TABLET | Refills: 2 | Status: SHIPPED | OUTPATIENT
Start: 2023-11-22 | End: 2024-01-02 | Stop reason: SDUPTHER

## 2023-11-22 NOTE — PROGRESS NOTES
Subjective   Patient ID: Whitley Cohn is a 68 y.o. female who presents for No chief complaint on file. I am having panic, fearful and depressed.    The patient is alert, fully oriented, language is intact, and recent and remote memory intact. The patient denies any suicidal or homicidal ideation or plans. The patient presents with depressive features continuing without manic or psychotic symptoms. Thought is conflicted. Judgment or insight are limited. The patient continues to be conflicted about taking medications. The patient reports being depressed and is seeking medications.      Review of Systems   Neurological:         The patient is alert, fully oriented, language is intact, and recent and remote memory intact. The patient denies any suicidal or homicidal ideation or plans. The patient presents with depressive features continuing without manic or psychotic symptoms. Thought is conflicted. Judgment or insight are limited. The patient continues to be conflicted about taking medications. The patient reports being depressed and is seeking medications.     Psychiatric/Behavioral:          The patient is alert, fully oriented, language is intact, and recent and remote memory intact. The patient denies any suicidal or homicidal ideation or plans. The patient presents with depressive features continuing without manic or psychotic symptoms. Thought is conflicted. Judgment or insight are limited. The patient continues to be conflicted about taking medications. The patient reports being depressed and is seeking medications.     All other systems reviewed and are negative.      Objective   Physical Exam  Neurological:      General: No focal deficit present.      Mental Status: She is alert and oriented to person, place, and time. Mental status is at baseline.   Psychiatric:      Comments: The patient is alert, fully oriented, language is intact, and recent and remote memory intact. The patient denies any suicidal or  homicidal ideation or plans. The patient presents with depressive features continuing without manic or psychotic symptoms. Thought is conflicted. Judgment or insight are limited. The patient continues to be conflicted about taking medications. The patient reports being depressed and is seeking medications.           Lab Review:       Assessment/Plan   The risks, benefits & alternatives to the medications prescribed were explained to you and your support person, your , today. You and your support person were able to understand & repeat these risks, benefits & alternatives to this prescribed medication. You and your support person have agreed to proceed with treatment with the medications discussed based on the conclusion that the benefit outweighs the risks of this treatment regimen: increase mirtazapine to 15 mg daily at bedtime and follow up weekly as possible. We are also making a referral to psychology and specifically Dr. North at your request for psychotherapy.

## 2023-11-22 NOTE — TELEPHONE ENCOUNTER
"Caller: pt, [phone]    Can't sleep or relax.  Doesn't feel like medication prescribed is helping at all.  She would like to speak to you at first opportunity, as she feels this is \"URGENT\"  "

## 2023-11-27 ENCOUNTER — APPOINTMENT (OUTPATIENT)
Dept: INTEGRATIVE MEDICINE | Facility: CLINIC | Age: 68
End: 2023-11-27
Payer: COMMERCIAL

## 2023-11-27 ENCOUNTER — TELEMEDICINE (OUTPATIENT)
Dept: BEHAVIORAL HEALTH | Facility: CLINIC | Age: 68
End: 2023-11-27
Payer: COMMERCIAL

## 2023-11-27 DIAGNOSIS — F32.A ANXIETY AND DEPRESSION: ICD-10-CM

## 2023-11-27 DIAGNOSIS — F41.9 ANXIETY AND DEPRESSION: ICD-10-CM

## 2023-11-27 DIAGNOSIS — F41.1 GENERALIZED ANXIETY DISORDER: ICD-10-CM

## 2023-11-27 PROCEDURE — 99214 OFFICE O/P EST MOD 30 MIN: CPT | Performed by: PSYCHIATRY & NEUROLOGY

## 2023-11-27 ASSESSMENT — ENCOUNTER SYMPTOMS
PSYCHIATRIC NEGATIVE: 1
NEUROLOGICAL NEGATIVE: 1

## 2023-11-27 NOTE — PROGRESS NOTES
Subjective   Patient ID: Whitley Cohn is a 68 y.o. female who presents for No chief complaint on file. I am still feeling depressed.    The patient is alert, fully oriented, language is intact, and recent and remote memory intact. The patient denies any suicidal or homicidal ideation or plans. The patient presents with depressive features somewhat improved on mirtazapine 15 mg daily without manic or psychotic symptoms. Thought is obsessive with compulsive. Features. Judgment and insight are limited.  The patient continues to have many concerns with obsessional concerns.       Review of Systems   Neurological: Negative.         The patient is alert, fully oriented, language is intact, and recent and remote memory intact. The patient denies any suicidal or homicidal ideation or plans. The patient presents with depressive features somewhat improved on mirtazapine 15 mg daily without manic or psychotic symptoms. Thought is obsessive with compulsive. Features. Judgment and insight are limited.  The patient continues to have many concerns with obsessional concerns.      Psychiatric/Behavioral: Negative.          The patient is alert, fully oriented, language is intact, and recent and remote memory intact. The patient denies any suicidal or homicidal ideation or plans. The patient presents with depressive features somewhat improved on mirtazapine 15 mg daily without manic or psychotic symptoms. Thought is obsessive with compulsive. Features. Judgment and insight are limited.  The patient continues to have many concerns with obsessional concerns.      All other systems reviewed and are negative.      Objective   Physical Exam  Neurological:      General: No focal deficit present.      Mental Status: She is alert and oriented to person, place, and time. Mental status is at baseline.      Comments: The patient is alert, fully oriented, language is intact, and recent and remote memory intact. The patient denies any suicidal  or homicidal ideation or plans. The patient presents with depressive features somewhat improved on mirtazapine 15 mg daily without manic or psychotic symptoms. Thought is obsessive with compulsive. Features. Judgment and insight are limited.  The patient continues to have many concerns with obsessional concerns.      Psychiatric:      Comments: The patient is alert, fully oriented, language is intact, and recent and remote memory intact. The patient denies any suicidal or homicidal ideation or plans. The patient presents with depressive features somewhat improved on mirtazapine 15 mg daily without manic or psychotic symptoms. Thought is obsessive with compulsive. Features. Judgment and insight are limited.  The patient continues to have many concerns with obsessional concerns.            Lab Review:       Assessment/Plan   The risks, benefits & alternatives to the medications prescribed were explained to you and your support person, your , today. You & your support person were able to understand & repeat these risks, benefits & alternatives to this prescribed medication. You and your support persons have agreed to proceed with treatment with the medications discussed based on the conclusion that the benefit outweighs the risks of this treatment regimen: continue mirtazapine 15 mg daily at bedtime. Follow up on a weekly basis as needed and 4 to 6 weeks.

## 2023-11-29 ENCOUNTER — OFFICE VISIT (OUTPATIENT)
Dept: ORTHOPEDIC SURGERY | Facility: HOSPITAL | Age: 68
End: 2023-11-29
Payer: COMMERCIAL

## 2023-11-29 VITALS — BODY MASS INDEX: 25.83 KG/M2 | HEIGHT: 65 IN | WEIGHT: 155 LBS

## 2023-11-29 DIAGNOSIS — M43.16 SPONDYLOLISTHESIS OF LUMBAR REGION: ICD-10-CM

## 2023-11-29 DIAGNOSIS — M54.16 LUMBAR RADICULOPATHY: Primary | ICD-10-CM

## 2023-11-29 DIAGNOSIS — M48.062 LUMBAR STENOSIS WITH NEUROGENIC CLAUDICATION: ICD-10-CM

## 2023-11-29 PROCEDURE — 99214 OFFICE O/P EST MOD 30 MIN: CPT

## 2023-11-29 PROCEDURE — 1125F AMNT PAIN NOTED PAIN PRSNT: CPT

## 2023-11-29 PROCEDURE — 1159F MED LIST DOCD IN RCRD: CPT

## 2023-11-29 PROCEDURE — 1036F TOBACCO NON-USER: CPT

## 2023-11-29 RX ORDER — GABAPENTIN 300 MG/1
300 CAPSULE ORAL 2 TIMES DAILY
Qty: 60 CAPSULE | Refills: 2 | Status: SHIPPED | OUTPATIENT
Start: 2023-11-29 | End: 2024-02-27

## 2023-11-29 RX ORDER — PREDNISONE 20 MG/1
TABLET ORAL
Qty: 30 TABLET | Refills: 0 | Status: SHIPPED | OUTPATIENT
Start: 2023-11-29 | End: 2023-12-12 | Stop reason: ALTCHOICE

## 2023-11-29 ASSESSMENT — PAIN - FUNCTIONAL ASSESSMENT: PAIN_FUNCTIONAL_ASSESSMENT: 0-10

## 2023-11-29 ASSESSMENT — PAIN SCALES - GENERAL: PAINLEVEL_OUTOF10: 7

## 2023-12-01 NOTE — PROGRESS NOTES
HPI:  Patient is a 68-year-old female who presents today with a roughly 1 month history of bilateral foot and lower leg numbness and spasms, as well as spasms in the right glute.  She is concerned that it may be related to her medication she takes and related to pancreatitis.  She denies pain, numbness in the thighs.  She states she is bilateral shin spasms and that both feet are stiff and numb.  She states that her symptoms are worse with activity pain.  She states that rest improves her symptoms.  She states the symptoms are progressively worsening and starting to become somewhat constant.  She states she has seen a chiropractor which provided some relief for her low back pain, but she is concerned that she may have damaged her back.  She believes her symptoms may be related to her decrease in activity.  No other questions or concerns at time of visit.    ROS:  Reviewed on EMR and patient intake sheet.    PMH/SH:  Reviewed on EMR and patient intake sheet.    Exam:  MSK: Full strength and range of motion of lower extremities bilaterally.  Negative straight leg raise bilaterally.  General: No acute distress. Awake and conversant.  Eyes: Normal conjunctiva, anicteric. Round symmetric pupils.  ENT: Hearing grossly intact. No nasal discharge.  Neck: Neck is supple. No masses or thyromegaly.  Respiratory: Respirations are non-labored. No wheezing.  Skin: Warm. No rashes or ulcers.  Psych: Alert and oriented. Cooperative, appropriate mood and affect, normal judgement.  CV: No lower extremity edema.  Neuro: Sensation and CN II-XII grossly normal.    Radiology:     MRI personally reviewed and demonstrates spondylolisthesis of L4 and L5.  Multilevel disc degeneration present with disc bulging resulting in mild to moderate canal stenosis at multiple levels.  Multilevel disc bulging throughout entirety of lumbar spine.  Multilevel facet joint and ligamentum hypertrophy identified throughout lumbar spine.    Diagnosis:     Lumbar radiculopathy  Lumbar spondylolisthesis  Lumbar stenosis    Assessment and Plan:  Patient was seen today and evaluated for lumbar stenosis symptoms which started a little over 1 month ago.  We discussed conservative management of physical therapy, core strengthening, walking, over-the-counter pain medications such as Tylenol and ibuprofen, and limiting bending and twisting.  Patient was recommended to continue physical therapy.  I prescribed the patient prednisone as well as gabapentin today for radicular symptoms.  She was recommended to follow-up with Dr. Brito after the completion of her physical therapy in order to assess her current radicular symptoms.  At that point, we will discuss treatment modalities such as epidural injections or potentially surgery.  Patient feels all questions were properly answered at time of visit today.  Patient agrees to the plan above.    This note was dictated using speech recognition software and was not corrected for spelling or grammatical errors    Cuauhtemoc Waite PA-C  Department of Orthopaedic Surgery    [address] [place] [address]    Voicemail: [phone]   Appts: [phone]  Fax: [phone]

## 2023-12-04 ENCOUNTER — APPOINTMENT (OUTPATIENT)
Dept: BEHAVIORAL HEALTH | Facility: CLINIC | Age: 68
End: 2023-12-04
Payer: COMMERCIAL

## 2023-12-04 ENCOUNTER — APPOINTMENT (OUTPATIENT)
Dept: INTEGRATIVE MEDICINE | Facility: CLINIC | Age: 68
End: 2023-12-04
Payer: COMMERCIAL

## 2023-12-07 ENCOUNTER — APPOINTMENT (OUTPATIENT)
Dept: PRIMARY CARE | Facility: CLINIC | Age: 68
End: 2023-12-07
Payer: COMMERCIAL

## 2023-12-07 ENCOUNTER — APPOINTMENT (OUTPATIENT)
Dept: PRIMARY CARE | Facility: HOSPITAL | Age: 68
End: 2023-12-07
Payer: COMMERCIAL

## 2023-12-07 ENCOUNTER — TELEPHONE (OUTPATIENT)
Dept: PRIMARY CARE | Facility: CLINIC | Age: 68
End: 2023-12-07

## 2023-12-08 ENCOUNTER — APPOINTMENT (OUTPATIENT)
Dept: PRIMARY CARE | Facility: CLINIC | Age: 68
End: 2023-12-08
Payer: COMMERCIAL

## 2023-12-11 ENCOUNTER — APPOINTMENT (OUTPATIENT)
Dept: BEHAVIORAL HEALTH | Facility: CLINIC | Age: 68
End: 2023-12-11
Payer: COMMERCIAL

## 2023-12-12 ENCOUNTER — ALLIED HEALTH (OUTPATIENT)
Dept: INTEGRATIVE MEDICINE | Facility: CLINIC | Age: 68
End: 2023-12-12
Payer: COMMERCIAL

## 2023-12-12 DIAGNOSIS — M72.2 PLANTAR FASCIITIS, BILATERAL: ICD-10-CM

## 2023-12-12 DIAGNOSIS — F32.A ANXIETY AND DEPRESSION: Primary | ICD-10-CM

## 2023-12-12 DIAGNOSIS — M54.16 LUMBAR RADICULOPATHY: ICD-10-CM

## 2023-12-12 DIAGNOSIS — F41.9 ANXIETY AND DEPRESSION: Primary | ICD-10-CM

## 2023-12-12 PROCEDURE — 99215 OFFICE O/P EST HI 40 MIN: CPT | Performed by: INTERNAL MEDICINE

## 2023-12-12 ASSESSMENT — ENCOUNTER SYMPTOMS
HEADACHES: 0
MYALGIAS: 0
SLEEP DISTURBANCE: 0
ARTHRALGIAS: 0
FEVER: 0
BACK PAIN: 1
DYSURIA: 0
COUGH: 0
WEAKNESS: 0
NERVOUS/ANXIOUS: 1
DIFFICULTY URINATING: 0
CONSTIPATION: 1
FATIGUE: 0
SHORTNESS OF BREATH: 0
APPETITE CHANGE: 0

## 2023-12-12 NOTE — PROGRESS NOTES
Integrative Medicine Follow-up Visit :     Subjective   Patient ID: Whitley Cohn is a 68 y.o. female who presents for supplements and pinched nerve     Note: patient refused to be weighed and have her blood pressure/ pulse taken. Tells me she gets weighed enough. States she weighs 153 lb.     HPI  Still suffering with numbness in both feet. Pinched nerve is in the spine.   Had lots of labs to look at cause of numbness. Wants to know what supplements she should take. Shows me some that certain people recommend that she take.     She stopped her medication for her depression/anxiety. She has seen her psychiatrist. She was on mirtazipine. Had too many bad side effects.     Is not taking the pinched nerve medication- gabapentin or robaxin. Does not want to be on medicine. Went to five different practitioners for acupuncture. Says it did not help her pinched nerve in the past. Not sure if she went six times in a row to any of them.          Pain:in back and numbness in her feet.     Review of Systems   Constitutional:  Negative for appetite change, fatigue and fever.   HENT:  Negative for congestion and hearing loss.    Eyes:  Negative for visual disturbance.   Respiratory:  Negative for cough and shortness of breath.    Cardiovascular:  Negative for chest pain and leg swelling.   Gastrointestinal:  Positive for constipation.   Genitourinary:  Negative for difficulty urinating, dysuria and urgency.   Musculoskeletal:  Positive for back pain. Negative for arthralgias and myalgias.        Pain in lower back and has plantar fascitis-    Skin:  Negative for rash.   Neurological:  Negative for weakness and headaches.   Psychiatric/Behavioral:  Negative for behavioral problems and sleep disturbance. The patient is nervous/anxious.                   Objective   There were no vitals taken for this visit. Patient refused them.     Physical Exam  HENT:      Head: Normocephalic and atraumatic.      Mouth/Throat:      Mouth: Mucous  membranes are moist.   Cardiovascular:      Rate and Rhythm: Normal rate.   Pulmonary:      Effort: Pulmonary effort is normal. No respiratory distress.   Musculoskeletal:         General: No swelling or deformity.      Cervical back: Normal range of motion.   Skin:     General: Skin is dry.      Findings: No rash.   Neurological:      General: No focal deficit present.      Mental Status: She is alert and oriented to person, place, and time.   Psychiatric:      Comments: Normal affect                      Assessment/Plan     Problem List Items Addressed This Visit             ICD-10-CM    Anxiety and depression - Primary F41.9, F32.A     Severe and not well controlled it seems to me. Suggest continue seeing psychiatrist. She is very resistant to medications but I think this would help her to return to a euthymic state.   Continue to focus on exercise as well.   Did discuss that there are studies showing fiber rich diet with lots of fruits, vegetables, beans can be helpful in the prevention of depression.   Discussed supplements. Would not recommend many except vitamin d, multiple vitamin and algae dha/epa supplement. Sent to patient in full script.            Lumbar radiculopathy M54.16     See PT         Plantar fasciitis, bilateral M72.2     Recommend for her lumbar pinched nerve and her plantar fascitis that she consider seeing physical therapy.   Could also try six weekly visits of acupuncture to see if this would be helpful.               Recommend Follow up in : 3 months.      Aleena Cano MD PhD    Time Spent  Prep time on day of patient encounter: 5 minutes  Time spent directly with patient, family or caregiver: 35 minutes  Additional Time Spent on Patient Care Activities: 0 minutes  Documentation Time: 5 minutes  Other Time Spent: 0 minutes  Total: 45 minutes

## 2023-12-12 NOTE — ASSESSMENT & PLAN NOTE
Recommend for her lumbar pinched nerve and her plantar fascitis that she consider seeing physical therapy.   Could also try six weekly visits of acupuncture to see if this would be helpful.   
See PT  
Severe and not well controlled it seems to me. Suggest continue seeing psychiatrist. She is very resistant to medications but I think this would help her to return to a euthymic state.   Continue to focus on exercise as well.   Did discuss that there are studies showing fiber rich diet with lots of fruits, vegetables, beans can be helpful in the prevention of depression.   Discussed supplements. Would not recommend many except vitamin d, multiple vitamin and algae dha/epa supplement. Sent to patient in full script.     
TRANSFER

## 2023-12-12 NOTE — PATIENT INSTRUCTIONS
" Follow up with physical therapy for pinched nerve  Start ground flax seed. 1 tablespoon per day  [employer] algae omega  Continue the  multiple vitamin [employer] algae  Add vitamin d 2000 international units  Take magnesium citrate 400-500 mg   as capsule if possible. Take at bedtime.   Stop biotin.   8. Www.nutritionfacts.org  or read \"how not to die\" by Dr. Bryce Payne  9. Try six visits of acupuncture for the pinched nerve.   Follow up after six visits to the physical therapist of your choice.   Aleena Cano MD PhD   "

## 2023-12-18 ENCOUNTER — TELEMEDICINE (OUTPATIENT)
Dept: BEHAVIORAL HEALTH | Facility: CLINIC | Age: 68
End: 2023-12-18
Payer: COMMERCIAL

## 2023-12-18 DIAGNOSIS — F41.9 ANXIETY AND DEPRESSION: ICD-10-CM

## 2023-12-18 DIAGNOSIS — F32.A ANXIETY AND DEPRESSION: ICD-10-CM

## 2023-12-18 PROCEDURE — 99214 OFFICE O/P EST MOD 30 MIN: CPT | Performed by: PSYCHIATRY & NEUROLOGY

## 2023-12-18 SDOH — ECONOMIC STABILITY: FOOD INSECURITY: WITHIN THE PAST 12 MONTHS, YOU WORRIED THAT YOUR FOOD WOULD RUN OUT BEFORE YOU GOT MONEY TO BUY MORE.: NEVER TRUE

## 2023-12-18 SDOH — ECONOMIC STABILITY: FOOD INSECURITY: WITHIN THE PAST 12 MONTHS, THE FOOD YOU BOUGHT JUST DIDN'T LAST AND YOU DIDN'T HAVE MONEY TO GET MORE.: NEVER TRUE

## 2023-12-18 SDOH — ECONOMIC STABILITY: INCOME INSECURITY: IN THE LAST 12 MONTHS, WAS THERE A TIME WHEN YOU WERE NOT ABLE TO PAY THE MORTGAGE OR RENT ON TIME?: NO

## 2023-12-18 SDOH — HEALTH STABILITY: PHYSICAL HEALTH: ON AVERAGE, HOW MANY MINUTES DO YOU ENGAGE IN EXERCISE AT THIS LEVEL?: 60 MIN

## 2023-12-18 SDOH — ECONOMIC STABILITY: HOUSING INSECURITY
IN THE LAST 12 MONTHS, WAS THERE A TIME WHEN YOU DID NOT HAVE A STEADY PLACE TO SLEEP OR SLEPT IN A SHELTER (INCLUDING NOW)?: NO

## 2023-12-18 SDOH — ECONOMIC STABILITY: HOUSING INSECURITY: IN THE LAST 12 MONTHS, HOW MANY PLACES HAVE YOU LIVED?: 1

## 2023-12-18 SDOH — ECONOMIC STABILITY: TRANSPORTATION INSECURITY
IN THE PAST 12 MONTHS, HAS LACK OF TRANSPORTATION KEPT YOU FROM MEETINGS, WORK, OR FROM GETTING THINGS NEEDED FOR DAILY LIVING?: NO

## 2023-12-18 SDOH — ECONOMIC STABILITY: TRANSPORTATION INSECURITY
IN THE PAST 12 MONTHS, HAS THE LACK OF TRANSPORTATION KEPT YOU FROM MEDICAL APPOINTMENTS OR FROM GETTING MEDICATIONS?: NO

## 2023-12-18 SDOH — HEALTH STABILITY: PHYSICAL HEALTH: ON AVERAGE, HOW MANY DAYS PER WEEK DO YOU ENGAGE IN MODERATE TO STRENUOUS EXERCISE (LIKE A BRISK WALK)?: 7 DAYS

## 2023-12-18 SDOH — ECONOMIC STABILITY: GENERAL
WHICH OF THE FOLLOWING WOULD YOU LIKE TO GET CONNECTED TO IN ORDER TO RECEIVE A DISCOUNT OR FOR FREE? (CHOOSE ALL THAT APPLY): NONE OF THESE

## 2023-12-18 SDOH — ECONOMIC STABILITY: GENERAL
WHICH OF THE FOLLOWING DO YOU KNOW HOW TO USE AND HAVE ACCESS TO EVERY DAY? (CHOOSE ALL THAT APPLY): SMARTPHONE WITH CELLULAR DATA PLAN;DESKTOP COMPUTER, LAPTOP COMPUTER, OR TABLET WITH BROADBAND INTERNET CONNECTION

## 2023-12-18 ASSESSMENT — SOCIAL DETERMINANTS OF HEALTH (SDOH)
WITHIN THE LAST YEAR, HAVE TO BEEN RAPED OR FORCED TO HAVE ANY KIND OF SEXUAL ACTIVITY BY YOUR PARTNER OR EX-PARTNER?: NO
IN THE PAST 12 MONTHS, HAS THE ELECTRIC, GAS, OIL, OR WATER COMPANY THREATENED TO SHUT OFF SERVICE IN YOUR HOME?: NO
HOW OFTEN DO YOU ATTEND CHURCH OR RELIGIOUS SERVICES?: MORE THAN 4 TIMES PER YEAR
WITHIN THE LAST YEAR, HAVE YOU BEEN AFRAID OF YOUR PARTNER OR EX-PARTNER?: NO
HOW OFTEN DO YOU ATTENT MEETINGS OF THE CLUB OR ORGANIZATION YOU BELONG TO?: MORE THAN 4 TIMES PER YEAR
WITHIN THE LAST YEAR, HAVE YOU BEEN HUMILIATED OR EMOTIONALLY ABUSED IN OTHER WAYS BY YOUR PARTNER OR EX-PARTNER?: NO
HOW OFTEN DO YOU GET TOGETHER WITH FRIENDS OR RELATIVES?: MORE THAN THREE TIMES A WEEK
DO YOU BELONG TO ANY CLUBS OR ORGANIZATIONS SUCH AS CHURCH GROUPS UNIONS, FRATERNAL OR ATHLETIC GROUPS, OR SCHOOL GROUPS?: YES
WITHIN THE LAST YEAR, HAVE YOU BEEN KICKED, HIT, SLAPPED, OR OTHERWISE PHYSICALLY HURT BY YOUR PARTNER OR EX-PARTNER?: NO
HOW HARD IS IT FOR YOU TO PAY FOR THE VERY BASICS LIKE FOOD, HOUSING, MEDICAL CARE, AND HEATING?: PATIENT UNABLE TO ANSWER
IN A TYPICAL WEEK, HOW MANY TIMES DO YOU TALK ON THE PHONE WITH FAMILY, FRIENDS, OR NEIGHBORS?: MORE THAN THREE TIMES A WEEK
HOW HARD IS IT FOR YOU TO PAY FOR THE VERY BASICS LIKE FOOD, HOUSING, MEDICAL CARE, AND HEATING?: NOT HARD AT ALL

## 2023-12-18 ASSESSMENT — PATIENT HEALTH QUESTIONNAIRE - PHQ9
10. IF YOU CHECKED OFF ANY PROBLEMS, HOW DIFFICULT HAVE THESE PROBLEMS MADE IT FOR YOU TO DO YOUR WORK, TAKE CARE OF THINGS AT HOME, OR GET ALONG WITH OTHER PEOPLE: SOMEWHAT DIFFICULT
SUM OF ALL RESPONSES TO PHQ9 QUESTIONS 1 & 2: 2
1. LITTLE INTEREST OR PLEASURE IN DOING THINGS: SEVERAL DAYS
2. FEELING DOWN, DEPRESSED OR HOPELESS: SEVERAL DAYS

## 2023-12-18 ASSESSMENT — LIFESTYLE VARIABLES
HOW OFTEN DO YOU HAVE A DRINK CONTAINING ALCOHOL: NEVER
HOW MANY STANDARD DRINKS CONTAINING ALCOHOL DO YOU HAVE ON A TYPICAL DAY: PATIENT DOES NOT DRINK
AUDIT-C TOTAL SCORE: 0
HOW OFTEN DO YOU HAVE SIX OR MORE DRINKS ON ONE OCCASION: NEVER
SKIP TO QUESTIONS 9-10: 1

## 2023-12-18 NOTE — PROGRESS NOTES
Subjective   Patient ID: Whitley Cohn is a 68 y.o. female who presents for No chief complaint on file. I want to apologize for being a patient that is so uncooperative.    The patient is alert, fully oriented, language is intact, and recent and remote memory intact. The patient denies any suicidal or homicidal ideation or plans. The patient presents with mild chronic variable depressive features without manic or psychotic symptoms. Thought is clear. Judgment or insight are limited. The patient continues with obsessional anxious features.       Review of Systems   Neurological:         The patient is alert, fully oriented, language is intact, and recent and remote memory intact. The patient denies any suicidal or homicidal ideation or plans. The patient presents with mild chronic variable depressive features without manic or psychotic symptoms. Thought is clear. Judgment or insight are limited. The patient continues with obsessional anxious features.      Psychiatric/Behavioral:          The patient is alert, fully oriented, language is intact, and recent and remote memory intact. The patient denies any suicidal or homicidal ideation or plans. The patient presents with mild chronic variable depressive features without manic or psychotic symptoms. Thought is clear. Judgment or insight are limited. The patient continues with obsessional anxious features.      All other systems reviewed and are negative.      Objective   Physical Exam  Neurological:      General: No focal deficit present.      Mental Status: She is alert and oriented to person, place, and time. Mental status is at baseline.      Comments: The patient is alert, fully oriented, language is intact, and recent and remote memory intact. The patient denies any suicidal or homicidal ideation or plans. The patient presents with mild chronic variable depressive features without manic or psychotic symptoms. Thought is clear. Judgment or insight are limited. The  patient continues with obsessional anxious features.      Psychiatric:      Comments: The patient is alert, fully oriented, language is intact, and recent and remote memory intact. The patient denies any suicidal or homicidal ideation or plans. The patient presents with mild chronic variable depressive features without manic or psychotic symptoms. Thought is clear. Judgment or insight are limited. The patient continues with obsessional anxious features.            Lab Review:       Assessment/Plan   The risks, benefits & alternatives to the medications prescribed were explained to you and your support person, your , today. You & your support person were able to understand & repeat these risks, benefits & alternatives to this prescribed medication. You and your support person have agreed to proceed with treatment with the medications discussed based on the conclusion that the benefit outweighs the risks of this treatment regimen: at this time you have not tolerated mirtazapine or trazodone and have not been comfortable taking medications. You in general have not tolerated medications and wer recommend you follow up with your planned evaluation and potential treatment with Dr. North in psychotherapy.

## 2023-12-19 ENCOUNTER — TELEPHONE (OUTPATIENT)
Dept: BEHAVIORAL HEALTH | Facility: CLINIC | Age: 68
End: 2023-12-19
Payer: COMMERCIAL

## 2023-12-19 NOTE — PROGRESS NOTES
Patient called very anxious and feeling panicked. States she took remeron 15 mg last night but stated it did not work. She also took trazodone on Saturday When I asked what she is anxious about she became somatic. She is asking for something to sleep. She put her  on and he stated to tell you that she will take her medication as prescribed. She denies SI. I told her to go to closest ER if she feels unsafe. She was asking me questions on taking over the counter medications. I asked her if she discussed this with you. She told me yes and that you do not prescribe them. I told her that she needs to listen to you regarding medications. I tried teaching her about grounding exercises and working on changing her negative thoughts. Patient was very help rejecting. She spoke with me for 30 minutes but I told her I needed to end the call because I had patients waiting and I would send you a message.

## 2023-12-25 PROBLEM — E04.1 THYROID NODULE: Status: ACTIVE | Noted: 2022-11-06

## 2023-12-25 PROBLEM — M48.061 SPINAL STENOSIS OF LUMBAR REGION: Status: ACTIVE | Noted: 2023-12-08

## 2023-12-25 PROBLEM — D72.819 LEUKOPENIA: Status: ACTIVE | Noted: 2022-11-05

## 2023-12-25 PROBLEM — D50.9 IRON DEFICIENCY ANEMIA: Status: ACTIVE | Noted: 2022-11-05

## 2023-12-25 PROBLEM — I35.0 NONRHEUMATIC AORTIC VALVE STENOSIS: Status: ACTIVE | Noted: 2020-02-03

## 2023-12-25 PROBLEM — R03.0 BORDERLINE HYPERTENSION: Status: ACTIVE | Noted: 2020-02-03

## 2023-12-25 PROBLEM — M17.12 PRIMARY OSTEOARTHRITIS OF LEFT KNEE: Status: ACTIVE | Noted: 2017-08-30

## 2023-12-25 PROBLEM — M85.80 OSTEOPENIA: Status: ACTIVE | Noted: 2017-07-28

## 2023-12-25 PROBLEM — I51.7 LVH (LEFT VENTRICULAR HYPERTROPHY): Status: ACTIVE | Noted: 2022-11-05

## 2023-12-25 PROBLEM — J18.9 RECURRENT PNEUMONIA: Status: ACTIVE | Noted: 2023-04-04

## 2023-12-25 RX ORDER — MIRTAZAPINE 7.5 MG/1
TABLET, FILM COATED ORAL
COMMUNITY
Start: 2023-12-19 | End: 2024-01-02 | Stop reason: WASHOUT

## 2023-12-25 RX ORDER — TRAZODONE HYDROCHLORIDE 50 MG/1
TABLET ORAL
COMMUNITY
Start: 2023-11-19

## 2023-12-25 RX ORDER — GABAPENTIN 100 MG/1
CAPSULE ORAL
COMMUNITY
Start: 2023-11-29

## 2023-12-25 RX ORDER — GLUCOSAMINE/CHONDRO SU A 500-400 MG
1 TABLET ORAL 3 TIMES DAILY
COMMUNITY
Start: 2022-03-21

## 2023-12-25 RX ORDER — GELATIN
1 POWDER (GRAM) MISCELLANEOUS
COMMUNITY
Start: 2022-03-21

## 2023-12-25 RX ORDER — METHOCARBAMOL 500 MG/1
500 TABLET, FILM COATED ORAL 3 TIMES DAILY PRN
COMMUNITY

## 2024-01-02 ENCOUNTER — TELEMEDICINE (OUTPATIENT)
Dept: BEHAVIORAL HEALTH | Facility: CLINIC | Age: 69
End: 2024-01-02
Payer: COMMERCIAL

## 2024-01-02 DIAGNOSIS — F32.A ANXIETY AND DEPRESSION: ICD-10-CM

## 2024-01-02 DIAGNOSIS — F43.23 ADJUSTMENT DISORDER WITH MIXED ANXIETY AND DEPRESSED MOOD: ICD-10-CM

## 2024-01-02 DIAGNOSIS — F41.9 ANXIETY AND DEPRESSION: ICD-10-CM

## 2024-01-02 DIAGNOSIS — F41.8 ANXIETY WITH OBSESSIONAL FEATURES: ICD-10-CM

## 2024-01-02 PROCEDURE — 99214 OFFICE O/P EST MOD 30 MIN: CPT | Performed by: PSYCHIATRY & NEUROLOGY

## 2024-01-02 RX ORDER — MIRTAZAPINE 30 MG/1
30 TABLET, FILM COATED ORAL NIGHTLY
Qty: 90 TABLET | Refills: 0 | Status: SHIPPED | OUTPATIENT
Start: 2024-01-02 | End: 2024-04-01

## 2024-01-02 NOTE — PROGRESS NOTES
Subjective   Patient ID: Whitley Cohn is a 68 y.o. female who presents for No chief complaint on file. I have been on mirtazapine 30 mg daily.     The patient is alert, fully oriented, language is intact, and recent and remote memory intact. The patient denies any suicidal or homicidal ideation or plans. The patient presents with depressive features without any explicit manic or psychotic symptoms. Thought is clear. Judgment and insight are limited. The patient continues to have anxious and depressive features which are substantially improved with mirtazapine  which is also tolerated well.       Review of Systems   Neurological:         The patient is alert, fully oriented, language is intact, and recent and remote memory intact. The patient denies any suicidal or homicidal ideation or plans. The patient presents with depressive features without any explicit manic or psychotic symptoms. Thought is clear. Judgment and insight are limited. The patient continues to have anxious and depressive features which are substantially improved with mirtazapine  which is also tolerated well.      Psychiatric/Behavioral:          The patient is alert, fully oriented, language is intact, and recent and remote memory intact. The patient denies any suicidal or homicidal ideation or plans. The patient presents with depressive features without any explicit manic or psychotic symptoms. Thought is clear. Judgment and insight are limited. The patient continues to have anxious and depressive features which are substantially improved with mirtazapine  which is also tolerated well.      All other systems reviewed and are negative.      Objective   Physical Exam  Neurological:      General: No focal deficit present.      Mental Status: She is alert and oriented to person, place, and time.      Comments: The patient is alert, fully oriented, language is intact, and recent and remote memory intact. The patient denies any suicidal or homicidal  ideation or plans. The patient presents with depressive features without any explicit manic or psychotic symptoms. Thought is clear. Judgment and insight are limited. The patient continues to have anxious and depressive features which are substantially improved with mirtazapine  which is also tolerated well.      Psychiatric:      Comments: The patient is alert, fully oriented, language is intact, and recent and remote memory intact. The patient denies any suicidal or homicidal ideation or plans. The patient presents with depressive features without any explicit manic or psychotic symptoms. Thought is clear. Judgment and insight are limited. The patient continues to have anxious and depressive features which are substantially improved with mirtazapine  which is also tolerated well.            Lab Review:       Assessment/Plan   The risks, benefits & alternatives to the medications prescribed were explained to you and your support person, your , today. You and your  were able to understand & repeat these risks, benefits & alternatives to this prescribed medication. You and your support person have agreed to proceed with treatment with the medications discussed based on the conclusion that the benefit outweighs the risks of this treatment regimen: continue mirtazapine 30 mg daily at bedtime as you have been doing over the past week with benefit and tolerating well. Please follow up with your initial appointment with Dr. North, continue on mirtazapine and your physical and pain therapies.

## 2024-01-03 ENCOUNTER — TELEMEDICINE (OUTPATIENT)
Dept: BEHAVIORAL HEALTH | Facility: CLINIC | Age: 69
End: 2024-01-03
Payer: COMMERCIAL

## 2024-01-03 DIAGNOSIS — F54 PSYCHOLOGICAL FACTOR AFFECTING PHYSICAL CONDITION: ICD-10-CM

## 2024-01-03 DIAGNOSIS — F41.1 GENERALIZED ANXIETY DISORDER: ICD-10-CM

## 2024-01-03 PROCEDURE — 1036F TOBACCO NON-USER: CPT | Performed by: PSYCHOLOGIST

## 2024-01-03 PROCEDURE — 1159F MED LIST DOCD IN RCRD: CPT | Performed by: PSYCHOLOGIST

## 2024-01-03 PROCEDURE — 90791 PSYCH DIAGNOSTIC EVALUATION: CPT | Performed by: PSYCHOLOGIST

## 2024-01-03 PROCEDURE — 1125F AMNT PAIN NOTED PAIN PRSNT: CPT | Performed by: PSYCHOLOGIST

## 2024-01-04 ENCOUNTER — APPOINTMENT (OUTPATIENT)
Dept: INTEGRATIVE MEDICINE | Facility: CLINIC | Age: 69
End: 2024-01-04
Payer: COMMERCIAL

## 2024-01-05 ENCOUNTER — APPOINTMENT (OUTPATIENT)
Dept: BEHAVIORAL HEALTH | Facility: CLINIC | Age: 69
End: 2024-01-05
Payer: COMMERCIAL

## 2024-01-16 ENCOUNTER — APPOINTMENT (OUTPATIENT)
Dept: INTEGRATIVE MEDICINE | Facility: CLINIC | Age: 69
End: 2024-01-16
Payer: COMMERCIAL

## 2024-01-17 ENCOUNTER — APPOINTMENT (OUTPATIENT)
Dept: BEHAVIORAL HEALTH | Facility: CLINIC | Age: 69
End: 2024-01-17
Payer: COMMERCIAL

## 2024-01-30 ENCOUNTER — APPOINTMENT (OUTPATIENT)
Dept: PHYSICAL THERAPY | Facility: CLINIC | Age: 69
End: 2024-01-30
Payer: COMMERCIAL

## 2024-02-05 ENCOUNTER — APPOINTMENT (OUTPATIENT)
Dept: BEHAVIORAL HEALTH | Facility: CLINIC | Age: 69
End: 2024-02-05
Payer: COMMERCIAL

## 2024-02-06 ENCOUNTER — APPOINTMENT (OUTPATIENT)
Dept: PHYSICAL THERAPY | Facility: CLINIC | Age: 69
End: 2024-02-06
Payer: COMMERCIAL

## 2024-02-07 ENCOUNTER — APPOINTMENT (OUTPATIENT)
Dept: ORTHOPEDIC SURGERY | Facility: CLINIC | Age: 69
End: 2024-02-07
Payer: COMMERCIAL

## 2024-02-09 ENCOUNTER — APPOINTMENT (OUTPATIENT)
Dept: PHYSICAL THERAPY | Facility: CLINIC | Age: 69
End: 2024-02-09
Payer: COMMERCIAL

## 2024-02-12 ENCOUNTER — APPOINTMENT (OUTPATIENT)
Dept: PHYSICAL THERAPY | Facility: CLINIC | Age: 69
End: 2024-02-12
Payer: COMMERCIAL

## 2024-02-15 ENCOUNTER — DOCUMENTATION (OUTPATIENT)
Dept: PHYSICAL THERAPY | Facility: CLINIC | Age: 69
End: 2024-02-15
Payer: COMMERCIAL

## 2024-02-15 ENCOUNTER — EVALUATION (OUTPATIENT)
Dept: PHYSICAL THERAPY | Facility: CLINIC | Age: 69
End: 2024-02-15
Payer: COMMERCIAL

## 2024-02-15 DIAGNOSIS — M72.2 PLANTAR FASCIITIS OF RIGHT FOOT: ICD-10-CM

## 2024-02-15 NOTE — PROGRESS NOTES
Met with patient to discuss physical therapy needs. Pt was referred to me from a member of [facility].   Patient has a history and referral to physical therapy for her lower back pain. She reports benefiting greatly from myofascial work in the past for pain/injuries. We discussed my areas of expertise for breast cancer rehabilitation and pelvic floor physical therapy and my exercise-based approach with only small amounts of myofascial work. Patient and I agreed further pursuit of physical therapy with me at this time was not the right fit.     Visit was cancelled at this time.     Marley Wilson, PT

## 2024-03-04 ENCOUNTER — APPOINTMENT (OUTPATIENT)
Dept: PHYSICAL THERAPY | Facility: CLINIC | Age: 69
End: 2024-03-04
Payer: COMMERCIAL

## 2024-03-05 NOTE — PROGRESS NOTES
"Verbal consent was requested and obtained from patient on this date for a telehealth visit.    Whitley Cohn is a 68-year-old woman referred by Dr. Mullen for behavioral evaluation and possible treatment.  She presents with low back pain without radiculopathy that dates to October, 2023 and prominent anxiety and history of panic.    Pain status.  She reports low back pain without pain radiating to either extremity although she does acknowledge numbness in her feet.  Her pain began in October, 2023 and she quantifies her pain during evaluation is 5/10 in intensity with a range of 2-9/10.  She is unable to describe any pattern to her pain, unsure of factors that might exacerbate or alleviate her pain.  Upon persistent questioning she was able to acknowledge that her pain seems to be worse perhaps when she is upset or sad.  She has tried physical therapy and water therapy without significant success in alleviating her pain nor in improving her function.    Psychological status.  She has a history of anxiety agitation and panic that at one point was assessed at [facility] and she had a stint in their IOP.  She describes multiple stresses beginning last June and include trust issues with her , stress over her daughter who lives in [place], has 7 children and is \"impoverished\" after her  lost his job.  She detailed that her cousin is a hoarder and she is worried about another daughter's relationship.  She details a history of \"overwhelming stresses over the past 6-10 years.  She lists both interpersonal as well as medical stresses including injuring her knee in 2017 and requiring a meniscus repair.  She found that experience to be debilitating and attributes that experience to her pervasive anxiety and panic.  That anxiety would appear to often be present in her physical complaints and the review of her chart reveals no fewer than 44 contacts with medical providers in our system in December, 2023 " "alone.    She has twice been admitted psychiatrically when she felt \"unable to cope.\"  She was admitted to [facility] on 1 occasion and [facility] on another.  She denies suicidal or homicidal ideation.  She characterizes herself as generally highly functional and enjoys social and recreational activities.  She recalls a cancer diagnosis in 2006 that was not accompanied by symptoms of depression or anxiety.  During that time she was able to establish a sense of some control over her medical status and coped rather well with both the diagnosis and the treatment.    Psychosocial status.    She is  with 3 adult children and grandchildren.  She worked as a  in math and education.  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    Recommendations.  1.  She agrees to a trial of CBT to address generalized anxiety disorder and somatic preoccupation.  She will contact my office to set up a follow-up appointment.  "

## 2024-03-08 ENCOUNTER — APPOINTMENT (OUTPATIENT)
Dept: PHYSICAL THERAPY | Facility: CLINIC | Age: 69
End: 2024-03-08
Payer: COMMERCIAL

## 2024-03-12 ENCOUNTER — APPOINTMENT (OUTPATIENT)
Dept: BEHAVIORAL HEALTH | Facility: CLINIC | Age: 69
End: 2024-03-12
Payer: COMMERCIAL

## 2024-03-14 ENCOUNTER — APPOINTMENT (OUTPATIENT)
Dept: PHYSICAL THERAPY | Facility: CLINIC | Age: 69
End: 2024-03-14
Payer: COMMERCIAL

## 2024-04-04 PROBLEM — J02.9 SORE THROAT: Status: ACTIVE | Noted: 2024-04-04

## 2024-04-08 ENCOUNTER — APPOINTMENT (OUTPATIENT)
Dept: INTEGRATIVE MEDICINE | Facility: CLINIC | Age: 69
End: 2024-04-08
Payer: COMMERCIAL

## 2024-04-10 ENCOUNTER — APPOINTMENT (OUTPATIENT)
Dept: OTOLARYNGOLOGY | Facility: CLINIC | Age: 69
End: 2024-04-10
Payer: COMMERCIAL

## 2024-04-15 ENCOUNTER — APPOINTMENT (OUTPATIENT)
Dept: PRIMARY CARE | Facility: CLINIC | Age: 69
End: 2024-04-15
Payer: COMMERCIAL

## 2024-05-19 NOTE — PROGRESS NOTES
Looks like the patient went to the [facility] pain management and she has L4-5 severe spondylosis and spondylolisthesis with severe stenosis she refused surgery and she was offered an injection but it was not done.  Consider caudal PETR    History of Present Complaint:  The patient was referred to us by Referring Provider: ***. this is 69 y.o.  female {Accompanied by:[id number]}with a past history of follicular lymphoma with 8 cycles of chemotherapy, osteopenia, history of recurrent pneumonia since chemotherapy in 2016 with lower back pain and bilateral thigh pain with numbness in the feet presenting with {kt mrdica symptoms:94991}    Pain started {pain onset:39112}  Pain is {Better or worse:05302}   Patient history significant for the following red flags: {Red flags:03788}  The pain is described as {Pain description:18411} and is relieved by {pain relief:51453}      Prior Pain Therapies: {Prior pain therapy:91590}    Past surgical history:  {Past surgical history:83484}           Procedures:   *** the patient has had a ***% improvement in pain.    Portions of record reviewed for pertinent issues: active problem list, medication list, allergies, family history, social history, notes from last encounter, encounters, lab results, imaging and other available records.    I have personally reviewed the OARRS report for this patient. This report is scanned into the electronic medical record. I have considered the risks of abuse, dependence, addiction and diversion. It showed: Was on gabapentin switched recently to pregabalin 50 mg and takes few tramadol from PCP  OPIOID RISK ASSESSMENT SCORE ***/26  Opioid agreement: ***  Activities of daily living: ***  Adverse effects: ***  Analgesia: W/O ***/10, W ***/10  {***}  Toxicology screen: ***  Aberrant behavior: ***      Diagnostic studies:  11/1/2023: RESULT:      Counting reference:  Lumbosacral junction.  For the purposes of this  report,  L4-5 is considered the level of  the iliac crest and assume there  are 5 lumbar-type vertebrae.  Anatomic variant:  None.    Localizer images:  No significant findings.    Alignment:    Levocurvature centered at L3.  Minimal retrolisthesis of  T12-L1, L1-L2, and grade 1 degenerative anterolisthesis of L4-5.  Grade 1  anterolisthesis L5-S1, thought to be degenerative, without definite  evidence for spondylolysis, consider CT for further evaluation if  clinically indicated.    Bone marrow signal/fracture: Type I degenerative change at T11-T12 and  T12-L1.  No evidence of pathologic marrow infiltration.  No evidence of  prior fracture.    Conus:  The conus is within normal limits of signal intensity and  morphology.  Mild redundancy of cauda equina adjacent to the L4-5 level,  which may indicate significant stenosis.    Paraspinal soft tissues:   Mild atrophy of the dorsal paraspinal  musculature.    T10-T11, T11-T12, and T12-L1:  On the sagittal images, canal is patent at  these levels, with mild diffuse disc bulges noted.  Foramina is patent at  T10-T11, and likely mildly narrowed at T11-T12, and moderately narrowed  at T12-L1 due to facet arthropathy and disc bulge.    L1-L2:    Canal and foramina are patent.  Disc degeneration with mild  diffuse disc bulge noted.    L2-L3:    There is disc degeneration with diffuse disc bulge and facet  arthropathy causing mild canal narrowing without significant foraminal  stenosis.    L3-L4:    There is disc degeneration with diffuse disc bulge and facet  arthropathy causing moderate canal narrowing mild left and moderate right  foraminal stenosis.    L4-L5:    There is disc degeneration with diffuse disc bulge/pseudobulge  and facet arthropathy causing severe canal narrowing and moderate  bilateral foraminal stenosis.    L5-S1:    There is disc degeneration with diffuse disc bulge/pseudobulge  and facet arthropathy causing mild canal narrowing and moderate to severe  left foraminal stenosis.    Sacrum and iliac  wings:   The visualized sacrum and iliac wings are  within normal limits.  Exam End: --    Specimen Collected: 10/31/23 18:04 Last Resulted: 11/01/23 08:07           Employment/disability/litigation: {ktlitigation:94750}    Social History:  {KT social history:50811}       Review of Systems       Physical Exam       Assessment  ***           Plan  At least 50% of the visit was involved in the discussion of the options for treatment. We discussed exercises, medication, interventional therapies and surgery. Healthy life style is essential with patient hard work to achieve the wellness. In addition; discussion with the patient and/or family about any of the diagnostic results, impressions and/or recommended diagnostic studies, prognosis, risks and benefits of treatment options, instructions for treatment and/or follow-up, importance of compliance with chosen treatment options, risk-factor reduction, and patient/family education.         Pool therapy, walking in the pool, at least 3x per week for 30 minutes  *** Smoking cessation  Healthy lifestyle and anti-inflammatory diet in addition to weight control discussed with the patient  Alternative chronic pain therapies was discussed, encouraged and information was handed  Return to Clinic 3 months       *Please note this report has been produced using speech recognition software and may contain errors related to that system including grammar, punctuation and spelling as well as words and phrases that may be inappropriate. If there are questions or concerns, please feel free to contact me to clarify.    Rodrick Pond MD

## 2024-05-21 ENCOUNTER — APPOINTMENT (OUTPATIENT)
Dept: PAIN MEDICINE | Facility: CLINIC | Age: 69
End: 2024-05-21
Payer: COMMERCIAL

## 2024-05-22 ENCOUNTER — APPOINTMENT (OUTPATIENT)
Dept: OTOLARYNGOLOGY | Facility: CLINIC | Age: 69
End: 2024-05-22
Payer: COMMERCIAL

## 2024-05-31 ENCOUNTER — APPOINTMENT (OUTPATIENT)
Dept: PAIN MEDICINE | Facility: CLINIC | Age: 69
End: 2024-05-31
Payer: COMMERCIAL

## 2024-06-06 ENCOUNTER — EVALUATION (OUTPATIENT)
Dept: PHYSICAL THERAPY | Facility: CLINIC | Age: 69
End: 2024-06-06
Payer: COMMERCIAL

## 2024-06-06 DIAGNOSIS — M54.50 CHRONIC LOW BACK PAIN: Primary | ICD-10-CM

## 2024-06-06 DIAGNOSIS — M48.061 SPINAL STENOSIS, LUMBAR REGION WITHOUT NEUROGENIC CLAUDICATION: ICD-10-CM

## 2024-06-06 DIAGNOSIS — G89.29 CHRONIC LOW BACK PAIN: Primary | ICD-10-CM

## 2024-06-06 PROCEDURE — 97161 PT EVAL LOW COMPLEX 20 MIN: CPT | Mod: GP

## 2024-06-06 PROCEDURE — 97110 THERAPEUTIC EXERCISES: CPT | Mod: GP

## 2024-06-06 ASSESSMENT — PAIN SCALES - GENERAL: PAINLEVEL_OUTOF10: 5 - MODERATE PAIN

## 2024-06-06 ASSESSMENT — PAIN - FUNCTIONAL ASSESSMENT: PAIN_FUNCTIONAL_ASSESSMENT: 0-10

## 2024-06-06 ASSESSMENT — ENCOUNTER SYMPTOMS
DEPRESSION: 1
LOSS OF SENSATION IN FEET: 1
OCCASIONAL FEELINGS OF UNSTEADINESS: 1

## 2024-06-06 NOTE — PROGRESS NOTES
Physical Therapy  Physical Therapy Orthopedic Evaluation    Patient Name: Whitley oChn  MRN: [id number]  Today's Date: 6/6/2024  Time Calculation  Start Time: 0903  Stop Time: 0948  Time Calculation (min): 45 min    Insurance:  Visit number: 1 of [place]  Authorization info: None  Insurance Type: [facility] Choice Plus    General:  Reason for visit: Spinal stenosis, lumbar region without neurogenic claudication   Referred by: Moy Kimbrough MD     Current Problem:  1. Chronic low back pain        2. Spinal stenosis, lumbar region without neurogenic claudication  Referral to Physical Therapy          Precautions: current PT at [facility] [place]- 1x/week   Precautions  STEADI Fall Risk Score (The score of 4 or more indicates an increased risk of falling): 4      Medical History Form: Reviewed (scanned into chart)    Subjective:     Chief Complaint: Patient presents to clinic low back pain with stenosis and N&T in LEs  Onset Date: last year stenosis flare up  KAIT: Chronic    Current Condition:   Same    Pain:  Pain Assessment: 0-10  Pain Score: 5 - Moderate pain  Pain Location: Back  Pain Orientation: Right, Posterior  Pain Radiating Towards: hip  Location: bilateral   Description: numbness tightness in shins and feet-bottom spams in glute- more on R  Aggravating Factors: prolonged sitting, prolonged standing, extension, prolonged activity. 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    Red Flags: Do you have any of the following? No  Fever/chills, unexplained weight changes, dizziness/fainting, unexplained change in bowel or bladder functions, unexplained malaise or muscle weakness, night pain/sweats, numbness or tingling    Objective:  Objective     Dermatomes/Myotomes    L1: Grossly Intact  L2: Grossly Intact  L3: Grossly Intact  L4: Grossly Intact  L5: Grossly Intact  S1: Grossly Intact    L1: 5/5  L2: 5/5  L3: 5/5  L4: 5/5  L5: 5/5  S1: 5/5              ROM    Lumbar AROM (%)    Flexion: WNL  Extension: max loss- pain  (L) Side Bend: mod +  (R) Side Bend: Mod+               Hip PROM (Degrees)      (R)  (L)  Flexion: WNL  WNL     Extension: -5  -2       ER:  45  40    IR:  35  30            Strength Testing    Core/Abdominals: fair    Hip    (R)  (L)  Flexion: 4+/5  4/5     Extension: 3+/5  4/5    Abduction: 4/5  4+/5    Adduction: 5/5  5/5           Posture: posterior pelvic tilt, thoracic kyphosis, forward head rounded shoulders      Palpation: NO TTP      Flexibility: limited quad, especially L       Joint Mobility: limited t-spine and L-spine      Gait: decreased stride length and stance time, slight hip drop            Special Tests    90-90 SLR Test: P-B  Adria Test: P-B  SHERI Test: N-B  Slump Test: P-B    Radicular Symptoms: yes      Outcome Measures:  Other Measures  Oswestry Disablity Index (CHARLA): 22/50=44%disability       EDUCATION: Home exercise program, plan of care, activity modifications, pain management, and injury pathology       Goals: Set and discussed today  Active       PT Problem       PT Goal 1       Start:  06/06/24    Expected End:  07/04/24       STG 4 weeks:  Pt will be independent with home exercise program for self management of symptoms.  Pt will be able to start participating in yoga without increase in pain for improved ADL performance.  Pt will improve R glute  strength to 4/5 or better to improve posterior chain strength and lumbo pelvic stability to decrease stress on LBP with walking.               PT Goal 2       Start:  06/06/24    Expected End:  07/18/24       LTG 8+ weeks:  Pt discharge from aquatic therapy and show reduction of symptoms and tolerance to wbing and ambulation and full gravity improved for increased performance and progress towards PLOF.  Pt will verbalize understanding of ergonomic office alignment to decrease postural stress on body and increase tolerance for work/study/household duties.  Pt will ambulate for 5 miles on level surface without increase in pain over 3/10 and with minimal limp or normalized gait pattern to promote improved functional mobility.  Pt will improve OSWESTRY functional outcome score by MCID points to demonstrate improved functional mobility for ADLs and IADLs.               Plan of care was developed with input and agreement by the patient      Treatment Performed:  Access Code: [email or web address]  URL: [email or web address]  Date: 06/06/2024  Prepared by: Alfredito Ambriz    Exercises  - Clamshell with Resistance  - 1 x daily - 7 x weekly - 3 sets - 3 reps - 60 hold  - Seated Diaphragmatic Breathing  - 1 x daily - 7 x weekly - 3 sets - 10 reps - 6 hold  Lock Clam 3x15- 3 sec ea  Pt edu- desensitization, home program supplement to existing PT, activity modification, POC, px, Dx, aqua progression x14min    Codes:  Low complex Eval  TEx2    Already performing bride, DKTC, supine march, band pullapart, banded ER, hip ABD, pelvic tilts    Assessment: Patient presents with signs and symptoms consistent with low back pain and lower leg numbness and tingling due to spinal stenosis, resulting in limited participation in pain-free ADLs and inability to perform at their prior level of function. Pt would benefit from physical therapy to address the impairments found & listed previously in the objective section in order to  return to safe and pain-free ADLs and prior level of function.  PT Assessment Results: Decreased strength, Decreased range of motion, Decreased endurance, Impaired balance, Decreased mobility, Decreased coordination, Pain  Rehab Prognosis: Fair  Assessment Comment: Aquatic therapy here, already reciveing PT for same condition at   Clinical Presentation: Stable      Plan:  Rehab Potential: Fair  Plan of Care Agreement: Patient  Planned Interventions include: therapeutic exercise, self-care home management, manual therapy, therapeutic activities, gait training, neuromuscular coordination, vasopneumatic, dry needling, aquatic therapy  Frequency: 1 x Week  Duration: 6 Weeks    Open to mental health help and resources. Pool Therapy 4 visits. Neuro therapy recommendations, pilates recommendations.    Alfredito Ambriz PT, DPT #[id number]

## 2024-06-06 NOTE — Clinical Note
June 20, 2024    Moy Kimbrough MD  [address], [address]  [place] [address]    Patient: Whitley Cohn   YOB: 1955   Date of Visit: 6/6/2024       Dear No referring provider defined for this encounter.    The attached plan of care is being sent to you because your patient’s medical reimbursement requires that you certify the plan of care. Your signature is required to allow uninterrupted insurance coverage.      You may indicate your approval by signing below and faxing this form back to us at Dept Fax: [phone].    Please call Dept: [phone] with any questions or concerns.    Thank you for this referral,        Alfredito Ambriz, PT  [facility] [place] [address] [address]  [address] [address] [address]    Payer: Payor: [facility] / Plan: [facility] / Product Type: *No Product type* /                                                                         Date:     Dear Alfredito Ambriz, PT,     Re: Ms. Whitley Cohn, MRN:[id number]    I certify that I have reviewed the attached plan of care and it is medically necessary for Ms. Whitley Cohn (1955) who is under my care.          ______________________________________                    _________________  Provider name and credentials                                           Date and time                                                                                           Plan of Care 6/6/24   Effective from: 6/6/2024  Effective to: 9/4/2024    Plan ID: [id number]            Participants as of Finalize on 6/20/2024    Name Type Comments Contact Info    Moy Kimbrough MD Consulting Physician Medicare [phone]       Last Plan Note     Author: Alfredito Ambriz PT Status: Signed Last edited: 6/10/2024  8:30 AM       Physical Therapy  Physical Therapy Treatment    Patient Name: Whitley Cohn  MRN: [id number]  Today's Date: 6/10/2024  Time  Calculation  Start Time: 0833  Stop Time: 0915  Time Calculation (min): 42 min    Insurance:  Visit number: 2 of [place]  Authorization info: None  Insurance Type: [facility] Choice Plus  Cert Dates:  6/6/24 to 9/4/24  General:  Reason for visit: Spinal stenosis, lumbar region without neurogenic claudication   Referred by: Eduardo Piña MD        Current Problem  1. Spinal stenosis, lumbar region without neurogenic claudication  Follow Up In Physical Therapy      2. Chronic low back pain  Follow Up In Physical Therapy          Precautions  Precautions Comment: recieving land PT at     Pain Assessment: 0-10  Pain Score: 3  Pain Location: Back  Pain Orientation: Right, Posterior  Pain Radiating Towards: hip    Subjective:   Patient reports feeling good today, was pretty painful Saturday, not sure why but thinks maybe increasing walking Sunday helped decrease pain.  HEP Performed:  Yes    Objective:   Decreased balance, need for UE support, difficulty with toe to heel retro walk with LOB      Treatments:     Exercise Sets/Reps Comments   Forward, backward walking 5' 4 foot depth; 92 degrees   Lateral walking 5'    Alt WDB pulldown [id number]    Hip abd 3x15 ea    Hip ext 3x15ea    Marching w/  WDB 5'    Step ups 2x10    Calf raises x15          Charges: Aqua x3    Assessment:   The focus of the session was Strengthening, ROM, Balance, Motor Control, Dynamic Stability Training, and functional training. The pt demonstrated Good and Improving tolerance to the noted exercises today. The pt is demonstrated Good and Improving progress in skilled rehab at this time. The pt is still limited in overall Strength, ROM, Flexibility, Motor control, Balance, Gait mechanics, and Pain at this time. The pt continues to be a good candidate for skilled PT, in order to further improve Strength, ROM, Flexibility, Motor control, Balance, Gait mechanics, and Pain. Pt able to tolerate exercise, did report slight spams in R gluteal area during some  exercise but overall just stayed with mild soreness in low back as reported at beginning of session.        Plan: pool 3 more visits    Alfredito Ambriz PT, DPT #[id number]         Current Participants as of 6/20/2024    Name Type Comments Contact Info    Moy Kimbrough MD Consulting Physician Medicare [phone]    Signature pending

## 2024-06-06 NOTE — Clinical Note
June 20, 2024    Moy Kimbrough MD  [address], [address]  [place] [address]    Patient: Whitley Cohn   YOB: 1955   Date of Visit: 6/6/2024       Dear No referring provider defined for this encounter.    The attached plan of care is being sent to you because your patient’s medical reimbursement requires that you certify the plan of care. Your signature is required to allow uninterrupted insurance coverage.      You may indicate your approval by signing below and faxing this form back to us at Dept Fax: [phone].    Please call Dept: [phone] with any questions or concerns.    Thank you for this referral,        Alfredito Ambriz, PT  [facility] [place] [address] [address]  [address] [address] [address]    Payer: Payor: [facility] / Plan: [facility] / Product Type: *No Product type* /                                                                         Date:     Dear Alfredito Ambriz, PT,     Re: Ms. Whitley Cohn, MRN:[id number]    I certify that I have reviewed the attached plan of care and it is medically necessary for Ms. Whitley Cohn (1955) who is under my care.          ______________________________________                    _________________  Provider name and credentials                                           Date and time                                                                                           Plan of Care 6/6/24   Effective from: 6/6/2024  Effective to: 9/4/2024    Plan ID: [id number]            Participants as of Finalize on 6/20/2024    Name Type Comments Contact Info    Moy Kimbrough MD Consulting Physician Medicare [phone]       Last Plan Note     Author: Alfredito Ambriz PT Status: Incomplete Last edited: 6/20/2024  3:15 PM       Physical Therapy  Physical Therapy Treatment    Patient Name: Whitley Cohn  MRN: [id number]  Today's Date: 6/20/2024        Insurance:  Visit number: 3 of [place]  Authorization info: None  Insurance Type: [facility] Choice Plus  Cert Dates:  6/6/24 to 9/4/24  General:  Reason for visit: Spinal stenosis, lumbar region without neurogenic claudication   Referred by: Moy Kimbrough MD        Current Problem  1. Spinal stenosis, lumbar region without neurogenic claudication  Follow Up In Physical Therapy      2. Chronic low back pain  Follow Up In Physical Therapy          Precautions  Precautions Comment: recieving land PT at     Pain Assessment: 0-10  Pain Location: Back  Pain Orientation: Right, Posterior  Pain Radiating Towards: hip/glute    Subjective:   Patient reports   HEP Performed:  Yes    Objective:   Decreased balance, need for UE support, difficulty with toe to heel retro walk with LOB      Treatments:     Exercise Sets/Reps Comments   Forward, backward walking 5' 4 foot depth; 92 degrees   Lateral walking 5'    Alt WDB pulldown [id number]    Hip abd 3x15 ea    Hip ext 3x15ea    Marching w/  WDB 5'    Step ups 2x10    Calf raises x15          Charges: Aqua x3    Assessment:   The focus of the session was Strengthening, ROM, Balance, Motor Control, Dynamic Stability Training, and functional training. The pt demonstrated Good and Improving tolerance to the noted exercises today. The pt is demonstrated Good and Improving progress in skilled rehab at this time. The pt is still limited in overall Strength, ROM, Flexibility, Motor control, Balance, Gait mechanics, and Pain at this time. The pt continues to be a good candidate for skilled PT, in order to further improve Strength, ROM, Flexibility, Motor control, Balance, Gait mechanics, and Pain. Pt able to tolerate exercise, did report slight spams in R gluteal area during some exercise but overall just stayed with mild soreness in low back as reported at beginning of session.        Plan: pool 2 more visits    Alfredito Ambriz PT, DPT #[id number]         Current Participants as of  6/20/2024    Name Type Comments Contact Info    Moy Kimbrough MD Consulting Physician Medicare [phone]    Signature pending

## 2024-06-10 ENCOUNTER — TREATMENT (OUTPATIENT)
Dept: PHYSICAL THERAPY | Facility: CLINIC | Age: 69
End: 2024-06-10
Payer: COMMERCIAL

## 2024-06-10 DIAGNOSIS — G89.29 CHRONIC LOW BACK PAIN: ICD-10-CM

## 2024-06-10 DIAGNOSIS — M48.061 SPINAL STENOSIS, LUMBAR REGION WITHOUT NEUROGENIC CLAUDICATION: ICD-10-CM

## 2024-06-10 DIAGNOSIS — M54.50 CHRONIC LOW BACK PAIN: ICD-10-CM

## 2024-06-10 PROCEDURE — 97113 AQUATIC THERAPY/EXERCISES: CPT | Mod: GP

## 2024-06-10 ASSESSMENT — PAIN SCALES - GENERAL: PAINLEVEL_OUTOF10: 3

## 2024-06-10 ASSESSMENT — PAIN - FUNCTIONAL ASSESSMENT: PAIN_FUNCTIONAL_ASSESSMENT: 0-10

## 2024-06-10 NOTE — PROGRESS NOTES
Physical Therapy  Physical Therapy Treatment    Patient Name: Whitley Cohn  MRN: [id number]  Today's Date: 6/10/2024  Time Calculation  Start Time: 0833  Stop Time: 0915  Time Calculation (min): 42 min    Insurance:  Visit number: 2 of [place]  Authorization info: None  Insurance Type: [facility] Choice Plus  Cert Dates:  6/6/24 to 9/4/24  General:  Reason for visit: Spinal stenosis, lumbar region without neurogenic claudication   Referred by: Moy Kimbrough MD        Current Problem  1. Spinal stenosis, lumbar region without neurogenic claudication  Follow Up In Physical Therapy      2. Chronic low back pain  Follow Up In Physical Therapy          Precautions  Precautions Comment: recieving land PT at     Pain Assessment: 0-10  Pain Score: 3  Pain Location: Back  Pain Orientation: Right, Posterior  Pain Radiating Towards: hip    Subjective:   Patient reports feeling good today, was pretty painful Saturday, not sure why but thinks maybe increasing walking Sunday helped decrease pain.  HEP Performed:  Yes    Objective:   Decreased balance, need for UE support, difficulty with toe to heel retro walk with LOB      Treatments:     Exercise Sets/Reps Comments   Forward, backward walking 5' 4 foot depth; 92 degrees   Lateral walking 5'    Alt WDB pulldown [id number]    Hip abd 3x15 ea    Hip ext 3x15ea    Marching w/  WDB 5'    Step ups 2x10    Calf raises x15          Charges: Aqua x3    Assessment:   The focus of the session was Strengthening, ROM, Balance, Motor Control, Dynamic Stability Training, and functional training. The pt demonstrated Good and Improving tolerance to the noted exercises today. The pt is demonstrated Good and Improving progress in skilled rehab at this time. The pt is still limited in overall Strength, ROM, Flexibility, Motor control, Balance, Gait mechanics, and Pain at this time. The pt continues to be a good candidate for skilled PT, in order to further improve Strength, ROM, Flexibility, Motor  control, Balance, Gait mechanics, and Pain. Pt able to tolerate exercise, did report slight spams in R gluteal area during some exercise but overall just stayed with mild soreness in low back as reported at beginning of session.        Plan: pool 3 more visits    Alfredito Ambriz PT, DPT #[id number]

## 2024-06-19 ENCOUNTER — APPOINTMENT (OUTPATIENT)
Dept: INTEGRATIVE MEDICINE | Facility: CLINIC | Age: 69
End: 2024-06-19
Payer: COMMERCIAL

## 2024-06-19 DIAGNOSIS — D22.5 MELANOCYTIC NEVI OF TRUNK: ICD-10-CM

## 2024-06-19 DIAGNOSIS — M54.16 LUMBAR RADICULOPATHY: ICD-10-CM

## 2024-06-19 DIAGNOSIS — M79.18 MYALGIA, OTHER SITE: ICD-10-CM

## 2024-06-19 DIAGNOSIS — F41.9 ANXIETY AND DEPRESSION: ICD-10-CM

## 2024-06-19 DIAGNOSIS — F51.04 PSYCHOPHYSIOLOGICAL INSOMNIA: ICD-10-CM

## 2024-06-19 DIAGNOSIS — F32.A ANXIETY AND DEPRESSION: ICD-10-CM

## 2024-06-19 DIAGNOSIS — M72.2 PLANTAR FASCIITIS OF RIGHT FOOT: ICD-10-CM

## 2024-06-19 DIAGNOSIS — M72.2 PLANTAR FASCIITIS, BILATERAL: ICD-10-CM

## 2024-06-19 DIAGNOSIS — M54.59 OTHER LOW BACK PAIN: ICD-10-CM

## 2024-06-19 DIAGNOSIS — M99.02 SOMATIC DYSFUNCTION OF THORACIC REGION: ICD-10-CM

## 2024-06-19 DIAGNOSIS — M54.2 CERVICALGIA: Primary | ICD-10-CM

## 2024-06-19 DIAGNOSIS — M99.01 SOMATIC DYSFUNCTION OF CERVICAL REGION: ICD-10-CM

## 2024-06-19 DIAGNOSIS — C85.90 NON-HODGKIN'S LYMPHOMA, UNSPECIFIED BODY REGION, UNSPECIFIED NON-HODGKIN LYMPHOMA TYPE (MULTI): ICD-10-CM

## 2024-06-19 DIAGNOSIS — M99.04 SACROILIAC JOINT SOMATIC DYSFUNCTION: ICD-10-CM

## 2024-06-19 PROCEDURE — 99205 OFFICE O/P NEW HI 60 MIN: CPT | Performed by: HOSPITALIST

## 2024-06-19 NOTE — PROGRESS NOTES
Patient ID: Whitley Cohn is a 69 y.o. female.  Referring Physician: No referring provider defined for this encounter.  Primary Care Provider: Eduardo Piña MD    CANCER HISTORY:   68 yo woman with h/o NHL (2006) stage IV   Chemotherapy ([facility]) at [facility] with CVP + ritux, then maint ritux - remission    Concerns:  Immune system function - often had pneumonia   Had pneumovax last 2016 - should d/w PCP    INTEGRATIVE HISTORY:  Seen by pain and Dr. Cano    Symptoms:  Anxiety - related to symptoms    Chronic LBP with bilat sciatica - seeing pain mgmt  Pinched nerve (stenosis) in L4/5, scoliosis   Worse since 10/23   Causes numbness, spasms in shins and glutes   Saw neuromuscular therapist 12 yrs ago   12 years ago had a flare up - then had injections, didn't help - had foot injury at the time, out of alignment   Occ tramadol very intermittently, does not take advil and causes pain in stomach, tylenol doesn't work well   Saw functional medicine doctor - sugg naltrexone in compounding pharmacy - did not help yet    Diet: overall good  Doesn't eat red meat    PA: walk but not as much as usual, going to a fitness center and doing machine weights  Yoga once/weekly    Sleep: not as well as usual    Stress: usually with exercise  Tries to do breathing exercises, not helping much  Pain and worry about those symptoms - has seen ortho    Natural Products:    Naltrexone  Vit C  B complex  ALA   B pollen  Mg citrate  Biotin  Vit D3  Gelatin   Coq10  Fish oil  Plant stanyl shelley  Collagen  Probiotic  Eye vitamin - lutein and xanthin  Glucosamin/chondroitin  L-methyl folate    ROS:  no ha, visual symptoms, hearing loss  no sob, chest pain, palp  ROS o/w non contributory, please see HPI    Objective    BSA: There is no height or weight on file to calculate BSA.  There were no vitals taken for this visit.    PHYSICAL EXAM:  NAD, awake/alert  HEENT, NCAT, OP clear, no oral lesions  CTA bilat  RRR no mgr  Abd  soft/nt/nd+bs  No c/c/e/ttp  Motor/sensory intact, CN 2-12 intact     RESULTS:  Lab Results   Component Value Date    WBC 4.1 (L) 12/17/2019    HGB 13.5 12/17/2019    HCT 39.2 12/17/2019     12/17/2019     12/17/2019    CREATININE 0.92 12/17/2019    AST 34 12/17/2019     Assessment/Plan   70 yo woman with h/o NHL (2006) stage IV   [facility] at [facility] with CVP + ritux, then maint ritux - remission    CANCER SPECIFIC RECCS:  LIFESTYLE:  Diet - 5-9 fruits/veg/day (7 veg to 2 fruits)  Cruciferous Vegetables - Brussel Sprouts, Kale, Broccoli, Cauliflower  Plant based anti-inflammatory whole foods diet  AICR New American Plate  PHYSICAL ACTIVITY 30 MIN/DAY    Reading:  Anticancer Living  Cancer Fighting Kitchen    Websites:  Cook for your Life  Cancerchoices.org    SYMPTOM MANAGEMENT:  Cancer Related Pain:  Consider the cause of pain and collaborative care    Integrative Modalities to consider:  Acupuncture weekly in Integrative Oncology Symptom Management Clinic weekly x 6 weeks then reassess   Did not work in past  Massage/Reflexology  Reiki  Consider role of PT/OT  Consider chiropractic care - has tried in past  Consider yoga - doing  Consider expressive arts therapy   Ice or heat   Consider BIOMAT    Stress Management:    Natural Products to Consider:  Arnica (Boiron) tablets or topically (homeopathic)  Vitamin D3 2000 international units/day for joint pains    Consider: Lidoderm patch - consider    Anxiety:  Integrative Modalities to consider:  Massage Therapy  Acupuncture - doesn't help  Reiki  Music Therapy (including HEALTH Tracks), expressive arts    Natural Products to Consider:  Ashwaghanda (Cristin)  Essential oils: Consider Lavender in a diffuser    Naltrexone  Vit C  B complex  ALA   B pollen - would stop  Mg citrate  Biotin - +/-  Vit D3  Gelatin - would not  Coq10  Fish oil  Plant stanyl shelley  Collagen - would not  Probiotic  Eye vitamin - lutein and  xanthin  Glucosamin/chondroitin  L-methyl folate    Follow up:  2-3 months    Thank you for consulting me in for this patient  Hudson Ly MD

## 2024-06-20 ENCOUNTER — TREATMENT (OUTPATIENT)
Dept: PHYSICAL THERAPY | Facility: CLINIC | Age: 69
End: 2024-06-20
Payer: COMMERCIAL

## 2024-06-20 DIAGNOSIS — G89.29 CHRONIC LOW BACK PAIN: ICD-10-CM

## 2024-06-20 DIAGNOSIS — M48.061 SPINAL STENOSIS, LUMBAR REGION WITHOUT NEUROGENIC CLAUDICATION: ICD-10-CM

## 2024-06-20 DIAGNOSIS — M54.50 CHRONIC LOW BACK PAIN: ICD-10-CM

## 2024-06-20 PROCEDURE — 97113 AQUATIC THERAPY/EXERCISES: CPT | Mod: GP

## 2024-06-20 ASSESSMENT — PAIN - FUNCTIONAL ASSESSMENT: PAIN_FUNCTIONAL_ASSESSMENT: 0-10

## 2024-06-20 NOTE — PROGRESS NOTES
Physical Therapy  Physical Therapy Treatment    Patient Name: Whitley Cohn  MRN: [id number]  Today's Date: 6/20/2024       Insurance:  Visit number: 3 of MN  Authorization info: None  Insurance Type: [facility] Choice Plus  Cert Dates:  6/6/24 to 9/4/24  General:  Reason for visit: Spinal stenosis, lumbar region without neurogenic claudication   Referred by: Moy Kimbrough MD        Current Problem  1. Spinal stenosis, lumbar region without neurogenic claudication  Follow Up In Physical Therapy      2. Chronic low back pain  Follow Up In Physical Therapy          Precautions  Precautions Comment: recieving land PT at     Pain Assessment: 0-10  Pain Location: Back  Pain Orientation: Right, Posterior  Pain Radiating Towards: hip/glute    Subjective:   Patient reports feeling increased pain in low back today, unsure why but may be due to increased mental and emotional stressors that flare up pain.  HEP Performed:  Yes    Objective:   Decreased balance, need for UE support, difficulty with toe to heel retro walk with LOB      Treatments:     Exercise Sets/Reps Comments   Forward, backward walking 6 min ea 4 foot depth; 92 degrees   Lateral walking 5'    Alt WDB pulldown [id number]    Kickboard push pull 2x20    SLS kick board push pull [id number]    Marching w/  WDB 5'    Traction-pool noodle- 7feet 10min begin session for pain reduction; end session for reduction x8min    marching x15          Charges: Aqua x4    Assessment:   The focus of the session was Strengthening, ROM, Balance, Motor Control, Dynamic Stability Training, and functional training. The pt demonstrated Good and Improving tolerance to the noted exercises today. The pt is demonstrated Good and Improving progress in skilled rehab at this time. The pt is still limited in overall Strength, ROM, Flexibility, Motor control, Balance, Gait mechanics, and Pain at this time. The pt continues to be a good candidate for skilled PT, in order to further improve Strength,  ROM, Flexibility, Motor control, Balance, Gait mechanics, and Pain. Pt able to progress core stabs well after using pool noodle for reduction of pain after feeling increased pain coming to today's session. Pt also given mental health resources since there is likelihood of correlation between mental and emotional stressors and flare ups in symptoms. Pain down from 8/10 to 3/10.        Plan: pool 2 more visits    Alfredito Ambriz PT, DPT #[id number]

## 2024-06-28 ENCOUNTER — TREATMENT (OUTPATIENT)
Dept: PHYSICAL THERAPY | Facility: CLINIC | Age: 69
End: 2024-06-28
Payer: COMMERCIAL

## 2024-06-28 DIAGNOSIS — G89.29 CHRONIC LOW BACK PAIN: ICD-10-CM

## 2024-06-28 DIAGNOSIS — M48.061 SPINAL STENOSIS, LUMBAR REGION WITHOUT NEUROGENIC CLAUDICATION: ICD-10-CM

## 2024-06-28 DIAGNOSIS — M54.50 CHRONIC LOW BACK PAIN: ICD-10-CM

## 2024-06-28 PROCEDURE — 97113 AQUATIC THERAPY/EXERCISES: CPT | Mod: GP,CQ

## 2024-06-28 ASSESSMENT — PAIN - FUNCTIONAL ASSESSMENT: PAIN_FUNCTIONAL_ASSESSMENT: 0-10

## 2024-06-28 NOTE — PROGRESS NOTES
Physical Therapy  Physical Therapy Treatment    Patient Name: Whitley Cohn  MRN: [id number]  Today's Date: 6/28/2024  Time Calculation  Start Time: 0815  Stop Time: 0909  Time Calculation (min): 54 min    Insurance:  Visit number: 4 of MN  Authorization info: None  Insurance Type: [facility] Choice Plus  Cert Dates:  6/6/24 to 9/4/24  General:  Reason for visit: Spinal stenosis, lumbar region without neurogenic claudication   Referred by: Moy Kimbrough MD        Current Problem  1. Spinal stenosis, lumbar region without neurogenic claudication  Follow Up In Physical Therapy      2. Chronic low back pain  Follow Up In Physical Therapy          Precautions  Precautions Comment: recieving land PT at     Pain Assessment: 0-10    Subjective:   Patient reports that they are pain free today and that they have some tingling in the feet and the lower leg.   HEP Performed:  Yes    Objective:     Lateral trunk compensation during hip abduction.     Treatments:     Exercise Sets/Reps Comments   Forward, backward walking 6 min ea 4 foot depth; 92 degrees   Lateral walking 5'    Hip abduction  5'    Traction pool noodle  10'     Squats !p 4'     Marching !p  5'     Piriformis stretch  1.5'x2     Hamstring stretch  1x2'    Standing dumbbell flex/extension (balance)  5'     HR/TR 4'    Charges: Aqua x4    Assessment:   Pt reported having an increase in tingling and pain in the LE and foot during squats and marching in the 4 foot depth pool. Pt reported relief post stretches seated in 3ft depth.       Plan: pool 1 more visit. Continue to progress LE SL balance.     Darion Charlton PTA

## 2024-07-03 ENCOUNTER — APPOINTMENT (OUTPATIENT)
Dept: PHYSICAL THERAPY | Facility: CLINIC | Age: 69
End: 2024-07-03
Payer: COMMERCIAL

## 2024-07-11 ENCOUNTER — TREATMENT (OUTPATIENT)
Dept: PHYSICAL THERAPY | Facility: CLINIC | Age: 69
End: 2024-07-11
Payer: COMMERCIAL

## 2024-07-11 DIAGNOSIS — M54.50 CHRONIC LOW BACK PAIN: ICD-10-CM

## 2024-07-11 DIAGNOSIS — M48.061 SPINAL STENOSIS, LUMBAR REGION WITHOUT NEUROGENIC CLAUDICATION: ICD-10-CM

## 2024-07-11 DIAGNOSIS — G89.29 CHRONIC LOW BACK PAIN: ICD-10-CM

## 2024-07-11 PROCEDURE — 97113 AQUATIC THERAPY/EXERCISES: CPT | Mod: GP

## 2024-07-11 ASSESSMENT — PAIN - FUNCTIONAL ASSESSMENT: PAIN_FUNCTIONAL_ASSESSMENT: 0-10

## 2024-07-11 ASSESSMENT — PAIN SCALES - GENERAL: PAINLEVEL_OUTOF10: 3

## 2024-07-11 NOTE — PROGRESS NOTES
Physical Therapy  Physical Therapy Treatment    Patient Name: Whitley Cohn  MRN: [id number]  Today's Date: 7/11/2024  Time Calculation  Start Time: 1429  Stop Time: 1515  Time Calculation (min): 46 min    Insurance:  Visit number: 5 of MN  Authorization info: None  Insurance Type: [facility] Choice Plus  Cert Dates:  6/6/24 to 9/4/24  General:  Reason for visit: Spinal stenosis, lumbar region without neurogenic claudication   Referred by: Moy Kimbrough MD        Current Problem  1. Spinal stenosis, lumbar region without neurogenic claudication  Follow Up In Physical Therapy      2. Chronic low back pain  Follow Up In Physical Therapy            Precautions  Precautions Comment: recieving land PT at     Pain Assessment: 0-10  0-10 (Numeric) Pain Score: 3  Pain Location: Back    Subjective:   Patient reports feeling much better but not quite 100% is ready to be discharged with aquatic home program. Taking yoga and rachelle class   HEP Performed:  Yes    Objective:   Improved ambulation tolerance       Treatments:     Exercise Sets/Reps Comments   Forward, backward walking 6 min ea Tposition in 7foot  4 foot depth; 92 degrees   T position hold ABD/flexion ext 3x20ea    Alt WDB pulldown [id number]    Kickboard push pull 3x20    Mini squat stir the pot 3x15 cw/ccw    Recipwalk w/recip  WDB x3laps    Traction-pool noodle- 7feet 10min begin session for pain reduction; end session for reduction x8min    SLS alt pulldown/push pull 2x15ea          Charges: Aqua x3    Assessment:   The focus of the session was Strengthening, ROM, Balance, Motor Control, Dynamic Stability Training, and functional training. The pt demonstrated Good tolerance to the noted exercises today. The pt is demonstrated Good progress in skilled rehab at this time. Pt ready for DC with home program          Plan:     Physical Therapy  Discharge Summary    Referral/Discharge Info:  Date of Discharge: 7/11/24  Date of Last Visit: 7/11/24  Date of Evaluation:  6/6/24  Number of Visits Attended: 5  Referred by:   Moy Kimbrough MD     Referred for: Spinal stenosis, lumbar region without neurogenic claudication     Problems/Issues Addressed:  Low back pain with recommendation for aqua therapy      Status at Discharge:  Goals met with home program    Reason for Discharge:  Achieved all and/or most goals             Alfredito mAbriz PT, DPT #[id number]

## 2024-07-15 ENCOUNTER — APPOINTMENT (OUTPATIENT)
Dept: OTOLARYNGOLOGY | Facility: CLINIC | Age: 69
End: 2024-07-15
Payer: COMMERCIAL

## 2024-07-17 ENCOUNTER — APPOINTMENT (OUTPATIENT)
Dept: INTEGRATIVE MEDICINE | Facility: CLINIC | Age: 69
End: 2024-07-17
Payer: COMMERCIAL

## 2024-07-17 DIAGNOSIS — F32.A ANXIETY AND DEPRESSION: ICD-10-CM

## 2024-07-17 DIAGNOSIS — M79.18 MYALGIA, OTHER SITE: ICD-10-CM

## 2024-07-17 DIAGNOSIS — M99.04 SACROILIAC JOINT SOMATIC DYSFUNCTION: ICD-10-CM

## 2024-07-17 DIAGNOSIS — M54.2 CERVICALGIA: ICD-10-CM

## 2024-07-17 DIAGNOSIS — M72.2 PLANTAR FASCIITIS OF RIGHT FOOT: ICD-10-CM

## 2024-07-17 DIAGNOSIS — M54.16 LUMBAR RADICULOPATHY: ICD-10-CM

## 2024-07-17 DIAGNOSIS — M54.59 OTHER LOW BACK PAIN: ICD-10-CM

## 2024-07-17 DIAGNOSIS — M99.01 SOMATIC DYSFUNCTION OF CERVICAL REGION: ICD-10-CM

## 2024-07-17 DIAGNOSIS — F41.9 ANXIETY AND DEPRESSION: ICD-10-CM

## 2024-07-17 DIAGNOSIS — M72.2 PLANTAR FASCIITIS, BILATERAL: Primary | ICD-10-CM

## 2024-07-17 DIAGNOSIS — F51.04 PSYCHOPHYSIOLOGICAL INSOMNIA: ICD-10-CM

## 2024-07-17 DIAGNOSIS — M99.02 SOMATIC DYSFUNCTION OF THORACIC REGION: ICD-10-CM

## 2024-07-17 PROCEDURE — MASS(CHAIR) MASSAGE (CHAIR): Performed by: HOSPITALIST

## 2024-07-17 NOTE — PROGRESS NOTES
Integrative Oncology Symptom Management Clinic: Reiki Initial Visit/Visit 1    Session Start   Date: 07.17.2024  Time: 0800    Session End   Date: 07.17.2024  Time: 0845    Assessment of patient:  Identified patient via full name and date of birth  Arrived ambulatory, steady gait, no assistive device   She noted she is in remission from Non-Hodgkins Lymphoma, noted her immunity is low  She is trying to heal from stress  Has a 'pinched' nerve that affects her feet with intermittent numbness and spasms in the right lower back and thigh  She has had PT intervention and has experienced neuromuscular therapy, and chiropractic, but prefers more soft tissue and not adjustments  Does not prefer acupuncture  She prefers hands on therapies including Reiki and massage  She is eating right and moving  Her family lifts her spirits, but some are far away and her Baptism  Her Why is to help people  WellbeingPre 6  CopingPre 1  PainPre7  TirednessPre 7  AnxietyPre 8  DepressionPre 8  StressPre 9  NauseaPre 5    Session goals:   To promote wellbeing, relief from pain, fatigue, anxiety, stress, and nausea    Patient Intention:   Healing    Provider Intention:   Patient's highest and greatest healing and wellbeing     Reiki procedure    Hand positions:   Supine Only:   Hands On: head: frontal, temporal, occiput, posterior neck, anterior and posterior shoulders, elbows, hands, mid-back, lower back, lateral hips, knees, ankles, dorsal and planter surfaces of feet  Hands Off/Hovering: anterior chest abdomen, pelvic region     Duration of session (minutes): 32    Hands on or hands off  [] hands on only  [] hands off only  [x] both     Were you interrupted during the session? No    Was music used during the session? Yes    What music recording was used?   Spotify: Spa Machine    Did the patient talk with you during the Reiki session? Yes, for a few minutes    Did the patient fall asleep during the Reiki session? No    Do you have any  comments about the session, such as the patient's reaction, or your experience during the Reiki?   Patient rested with eyes closed, relaxed facial and body posture, and deep, even breathing     Do you feel that the patient benefited from the treatment today? Yes  Noted she felt relaxed  Wellbeing, coping, pain, tiredness (fatigue), anxiety/stress, depression, nausea improved  WellbeingPost 9  CopingPost 8  PainPost 3  TirednessPost 4  AnxietyPost 3  DepressionPost 3  StressPost 3  NauseaPost 1    She notes she is scheduled for Integrative Oncology Symptom Management Clinic: Massage with Vira Maguire LMT next week  She would like to reschedule for Reiki   Interested in neuromuscular therapy and advised this was not a part of the Reiki Symptom Management Clinic: Reiki  Scheduled for Integrative Oncology Symptom Management Clinic: Reiki: 08.07 and 08.14.2024    Note signed by Nancy Menendez LMT on 07/17/24

## 2024-07-19 ENCOUNTER — APPOINTMENT (OUTPATIENT)
Dept: INTEGRATIVE MEDICINE | Facility: CLINIC | Age: 69
End: 2024-07-19
Payer: COMMERCIAL

## 2024-07-23 SDOH — ECONOMIC STABILITY: FOOD INSECURITY: WITHIN THE PAST 12 MONTHS, YOU WORRIED THAT YOUR FOOD WOULD RUN OUT BEFORE YOU GOT MONEY TO BUY MORE.: NEVER TRUE

## 2024-07-23 SDOH — SOCIAL STABILITY: SOCIAL NETWORK: I HAVE TROUBLE DOING ALL OF THE ACTIVITIES WITH FRIENDS THAT I WANT TO DO: ALWAYS

## 2024-07-23 SDOH — SOCIAL STABILITY: SOCIAL NETWORK: I HAVE TROUBLE DOING ALL OF MY USUAL WORK (INCLUDE WORK AT HOME): USUALLY

## 2024-07-23 SDOH — SOCIAL STABILITY: SOCIAL NETWORK: I HAVE TROUBLE DOING ALL OF MY REGULAR LEISURE ACTIVITIES WITH OTHERS: SOMETIMES

## 2024-07-23 SDOH — SOCIAL STABILITY: SOCIAL NETWORK: I HAVE TROUBLE DOING ALL OF THE FAMILY ACTIVITIES THAT I WANT TO DO: ALWAYS

## 2024-07-23 SDOH — ECONOMIC STABILITY: FOOD INSECURITY: WITHIN THE PAST 12 MONTHS, THE FOOD YOU BOUGHT JUST DIDN'T LAST AND YOU DIDN'T HAVE MONEY TO GET MORE.: NEVER TRUE

## 2024-07-23 SDOH — SOCIAL STABILITY: SOCIAL NETWORK

## 2024-07-23 SDOH — SOCIAL STABILITY: SOCIAL NETWORK: PROMIS ABILITY TO PARTICIPATE IN SOCIAL ROLES & ACTIVITIES T-SCORE: 37

## 2024-07-23 ASSESSMENT — PROMIS GLOBAL HEALTH SCALE
RATE_PHYSICAL_HEALTH: FAIR
RATE_GENERAL_HEALTH: GOOD
RATE_AVERAGE_PAIN: 8
RATE_MENTAL_HEALTH: FAIR
CARRYOUT_SOCIAL_ACTIVITIES: FAIR
RATE_QUALITY_OF_LIFE: FAIR
EMOTIONAL_PROBLEMS: OFTEN
RATE_SOCIAL_SATISFACTION: POOR
RATE_AVERAGE_FATIGUE: MODERATE
CARRYOUT_PHYSICAL_ACTIVITIES: MODERATELY

## 2024-07-23 ASSESSMENT — ANXIETY QUESTIONNAIRES
PAST MONTH HOW OFTEN HAVE YOU FELT CONFIDENT ABOUT YOUR ABILITY TO HANDLE YOUR PROBLEMS: 2 - SOMETIMES
PAST MONTH HOW OFTEN HAVE YOU FELT THAT YOU WERE UNABLE TO CONTROL THE IMPORTANT THINGS IN YOUR LIFE: 2 - SOMETIMES
PAST MONTH HOW OFTEN HAVE YOU FELT CONFIDENT ABOUT YOUR ABILITY TO HANDLE YOUR PROBLEMS: 2 - SOMETIMES
PAST MONTH HOW OFTEN HAVE YOU FELT THAT THINGS WERE GOING YOUR WAY: 2 - SOMETIMES
PAST MONTH HOW OFTEN HAVE YOU FELT THAT YOU WERE UNABLE TO CONTROL THE IMPORTANT THINGS IN YOUR LIFE: 2 - SOMETIMES
PAST MONTH HOW OFTEN HAVE YOU FELT DIFFICULTIES WERE PILING UP SO HIGH THAT YOU COULD NOT OVERCOME THEM: 2 - SOMETIMES

## 2024-07-24 ENCOUNTER — APPOINTMENT (OUTPATIENT)
Dept: INTEGRATIVE MEDICINE | Facility: CLINIC | Age: 69
End: 2024-07-24
Payer: COMMERCIAL

## 2024-07-24 DIAGNOSIS — F51.04 PSYCHOPHYSIOLOGICAL INSOMNIA: ICD-10-CM

## 2024-07-24 DIAGNOSIS — F32.A ANXIETY AND DEPRESSION: ICD-10-CM

## 2024-07-24 DIAGNOSIS — M99.04 SACROILIAC JOINT SOMATIC DYSFUNCTION: Primary | ICD-10-CM

## 2024-07-24 DIAGNOSIS — M99.01 SOMATIC DYSFUNCTION OF CERVICAL REGION: ICD-10-CM

## 2024-07-24 DIAGNOSIS — M54.16 LUMBAR RADICULOPATHY: ICD-10-CM

## 2024-07-24 DIAGNOSIS — M79.18 MYALGIA, OTHER SITE: ICD-10-CM

## 2024-07-24 DIAGNOSIS — F41.9 ANXIETY AND DEPRESSION: ICD-10-CM

## 2024-07-24 DIAGNOSIS — M54.2 CERVICALGIA: ICD-10-CM

## 2024-07-24 DIAGNOSIS — M72.2 PLANTAR FASCIITIS, BILATERAL: ICD-10-CM

## 2024-07-24 DIAGNOSIS — M54.59 OTHER LOW BACK PAIN: ICD-10-CM

## 2024-07-24 DIAGNOSIS — M72.2 PLANTAR FASCIITIS OF RIGHT FOOT: ICD-10-CM

## 2024-07-24 DIAGNOSIS — M99.02 SOMATIC DYSFUNCTION OF THORACIC REGION: ICD-10-CM

## 2024-07-24 PROCEDURE — 97140 MANUAL THERAPY 1/> REGIONS: CPT | Performed by: HOSPITALIST

## 2024-07-24 ASSESSMENT — PAIN SCALES - GENERAL: PAINLEVEL_OUTOF10: 4

## 2024-07-24 NOTE — PROGRESS NOTES
Massage Therapy Visit:     Whitley Cohn was referred by Dr. Ly..    Condition of Client Subjective :  Patient ID: Whitley Cohn is a 69 y.o. female who presents  for a 50 minute Daniel Therapy.  Patient has been treated for Non-Hodgkin's lymphoma.  Patient stated most of her discomfort is in her legs and feet.  I worked very closely with this patient.  She requested that I not touch the arch of her feet.  This was a gentle treatment.  She has some low back and  hip discomfort.  Patient was able to relax.      Session Information  Visit Type: New patient  Description of present complaint: Muscle tension        Objective   Pre-treatment Assessment  Arrival Mode: Ambulatory  Pain Score: 7  Anxiety Level (0-10): 7  Stress Level (0-10): 7  Coping Level (0-10): 3  Depression Level (0-10): 7  Fatigue Level (0-10): 6  Nausea Level (0-10): 5  Wellbeing Level (0-10): 7        Actions Assessment/Plan :  Provider reviewed plan for the massage session, precautions and contraindications. Patient/guardian/hospital staff has given consent to treat with full understanding of what to expect during the session. Before massage therapy began, provider explained to the patient to communicate at any time if the pressure was causing discomfort past their tolerance level. Patient agreed to advise therapist.    Massage Treatment  Patient Position: Table  Positioning Assistance: Did not need assistance, Pillow(s)/bolster under knees while supine, Pillow(s)/bolster under anles while prone  Massage Technique: Relaxation massage  Pressure Scale: 2 - Mild pressure, 1 - Light pressure, 3 - Medium pressure    Response:  Post-treatment Assessment  Patient Noted Improvement of the Following Symptoms: Muscle tension  0-10 (Numeric) Pain Score: 4  Anxiety Level (0-10): 5  Stress Level (0-10): 5  Coping Level (0-10): 7  Depression Level (0-10): 5  Fatigue Level (0-10): 5  Nausea Level (0-10): 2  Wellbeing Level (0-10): 4    Evaluation:   Patient will  follow up as needed.

## 2024-08-07 ENCOUNTER — APPOINTMENT (OUTPATIENT)
Dept: INTEGRATIVE MEDICINE | Facility: CLINIC | Age: 69
End: 2024-08-07
Payer: COMMERCIAL

## 2024-08-07 DIAGNOSIS — F41.9 ANXIETY AND DEPRESSION: ICD-10-CM

## 2024-08-07 DIAGNOSIS — M99.01 SOMATIC DYSFUNCTION OF CERVICAL REGION: Primary | ICD-10-CM

## 2024-08-07 DIAGNOSIS — M79.18 MYALGIA, OTHER SITE: ICD-10-CM

## 2024-08-07 DIAGNOSIS — F51.04 PSYCHOPHYSIOLOGICAL INSOMNIA: ICD-10-CM

## 2024-08-07 DIAGNOSIS — F32.A ANXIETY AND DEPRESSION: ICD-10-CM

## 2024-08-07 PROCEDURE — MASS(CHAIR) MASSAGE (CHAIR): Performed by: HOSPITALIST

## 2024-08-07 NOTE — PROGRESS NOTES
Integrative Oncology Symptom Management Clinic: Reiki Visit 2    Session Start   Date: 08.07.2024  Time: 0845    Session End   Date: 08.07.2024  Time: 0935 (patient was discussing her pain issue afterthe session)    Assessment of patient:  Identified patient via full name and date of birth  Arrived ambulatory, steady gait, no assistive device   Last session: she noted the benefit was relaxation  She noted she is in remission from Non-Hodgkins Lymphoma, noted her immunity is low  Has a 'pinched' nerve that affects her feet with intermittent numbness, tension in right hip, and spasms in the right lower back, gluteal, and thigh   She is exercising: weights, cardio, biking, yoga, and walking outside  Anxiety, stress, and sadness are related to the pain symptoms; it brings her down when pain and tension occur  Fatigue related to lack of sleep  She received massage therapy via the [facility] Oncology [facility] Management Clinic  WellbeingPre 5  CopingPre 5  Pain Pre 5  TirednessPre 5  AnxietyPre 5  DepressionPre 5  StressPre 5  NauseaPre 5    Session goals:   To promote wellbeing, relief from pain, fatigue, anxiety, stress, and nausea    Patient Intention:   Healing and Relaxation    Provider Intention:   Patient's highest and greatest healing and wellbeing     Reiki procedure    Hand positions:   Supine Only:   Hands On: head: frontal, temporal, occiput, posterior neck, anterior and posterior shoulders, elbows, hands, mid-back, lower back, lateral hips, knees, ankles, dorsal and planter surfaces of feet  Hands Off/Hovering: anterior chest abdomen, pelvic region     Duration of session (minutes): 32    Hands on or hands off  [] hands on only  [] hands off only  [x] both     Were you interrupted during the session? No    Was music used during the session? No    What music recording was used?   Sound Machine: Rain    Did the patient talk with you during the Reiki session? Yes, for a few minutes    Did the patient fall asleep  during the Reiki session? No    Do you have any comments about the session, such as the patient's reaction, or your experience during the Reiki?   Patient rested with eyes closed, relaxed facial and body posture, and deep, even breathing     Do you feel that the patient benefited from the treatment today? Yes  Noted she felt relaxed  Wellbeing, coping, pain, stress, nausea improved  WellbeingPost 6  CopingPost 6  PainPost 5  TirednessPost 5  AnxietyPost 5  DepressionPost 5  StressPost 4  NauseaPost 4    She would like to continue Reiki sessions  Interested in neuromuscular therapy and advised this was not a part of the Reiki Symptom Management Clinic: Reiki  Scheduled for:  Integrative Oncology Symptom Management Clinic: Massage Therapy: 08.26, 09.04  Integrative Oncology Symptom Management Clinic: Reiki: 08.14  Integrative Oncology Consult: Dr. Hudson Ly: 08.28  Chriopract: Dr. Nannette Boyd    Note signed by Nancy Menendez LMT on 08/07/24

## 2024-08-09 ENCOUNTER — ALLIED HEALTH (OUTPATIENT)
Dept: INTEGRATIVE MEDICINE | Facility: CLINIC | Age: 69
End: 2024-08-09
Payer: COMMERCIAL

## 2024-08-09 DIAGNOSIS — M54.16 LUMBAR RADICULOPATHY: ICD-10-CM

## 2024-08-09 DIAGNOSIS — M72.2 PLANTAR FASCIITIS, BILATERAL: Primary | ICD-10-CM

## 2024-08-09 PROCEDURE — 97112 NEUROMUSCULAR REEDUCATION: CPT | Performed by: CHIROPRACTOR

## 2024-08-09 NOTE — PROGRESS NOTES
Subjective   Patient ID: Whitley Cohn is a 69 y.o. female who presents August 9, 2024 for chiropractic care.    1/25 VPCY    Today, the patient rates their degree of pain as a 5 out of 10 on the numeric pain rating scale.     HPI : Whitley presents to my office today for low force chiropractic and soft tissue work. She has been dealing with ongoing intermittent foot pain, tightness related to plantar fascitis and numbness potentially related to lumbar stenosis. She reports that her right foot has been in a walking boot twice over the years and has created some postural imbalances as a result. She notes a sharp discomfort along the right lower gluteal region and lateral hip. She has found physical therapy, massage and reiki to be helpful in managing discomfort. Over 10 years ago she saw a neuromuscular specialist who performed a lot of soft tissue work that seemed to provide significant relief. She is not interested in any manual chiropractic adjustment, as she has had adverse responses in the past.      Objective   Physical Exam  Neurological:      General: No focal deficit present.      Mental Status: She is alert and oriented to person, place, and time.      Cranial Nerves: No dysarthria or facial asymmetry.      Sensory: Sensation is intact.      Motor: Motor function is intact.      Coordination: Coordination is intact.      Gait: Gait is intact. Gait normal.         Palpation of the following region(s) revealed:      Lumbar: Gluteal right, hypertonicity and tenderness.  Psoas bilateral, muscular hypertonicity.    Lower Extremity: Hamstring right, hypertonicity and tenderness.  Hip adductors bilateral, muscular hypertonicity.  Gastrocnemius / Soleus bilateral, muscular hypertonicity.  Tibialis anterior bilateral, muscular hypertonicity.          Assessment/Plan   Today's Treatment Included: Neuromuscular Re-Education ([id number]): Start time: 1:00 pm End time: 1:15 pm  1 Units    Soft-tissue mobilization was  performed in the following areas:       Gluteal mm. Glute. Med. bilateral and Psoas bilateral  Adductors bilateral  Tibialis Anterior bilateral and Calf mm. bilateral        Suggested F/U in 2 weeks    The patient tolerated today's treatment with little or no additional discomfort and was instructed to contact the office for questions or concerns.

## 2024-08-14 ENCOUNTER — APPOINTMENT (OUTPATIENT)
Dept: INTEGRATIVE MEDICINE | Facility: CLINIC | Age: 69
End: 2024-08-14
Payer: COMMERCIAL

## 2024-08-14 DIAGNOSIS — M54.59 OTHER LOW BACK PAIN: ICD-10-CM

## 2024-08-14 DIAGNOSIS — M99.02 SOMATIC DYSFUNCTION OF THORACIC REGION: ICD-10-CM

## 2024-08-14 DIAGNOSIS — M72.2 PLANTAR FASCIITIS, BILATERAL: ICD-10-CM

## 2024-08-14 DIAGNOSIS — F51.04 PSYCHOPHYSIOLOGICAL INSOMNIA: ICD-10-CM

## 2024-08-14 DIAGNOSIS — F32.A ANXIETY AND DEPRESSION: ICD-10-CM

## 2024-08-14 DIAGNOSIS — M79.18 MYALGIA, OTHER SITE: ICD-10-CM

## 2024-08-14 DIAGNOSIS — M54.16 LUMBAR RADICULOPATHY: ICD-10-CM

## 2024-08-14 DIAGNOSIS — M99.04 SACROILIAC JOINT SOMATIC DYSFUNCTION: ICD-10-CM

## 2024-08-14 DIAGNOSIS — M54.2 CERVICALGIA: ICD-10-CM

## 2024-08-14 DIAGNOSIS — M99.01 SOMATIC DYSFUNCTION OF CERVICAL REGION: ICD-10-CM

## 2024-08-14 DIAGNOSIS — F41.9 ANXIETY AND DEPRESSION: ICD-10-CM

## 2024-08-14 DIAGNOSIS — M72.2 PLANTAR FASCIITIS OF RIGHT FOOT: Primary | ICD-10-CM

## 2024-08-14 PROCEDURE — MASS(CHAIR) MASSAGE (CHAIR): Performed by: HOSPITALIST

## 2024-08-14 NOTE — PROGRESS NOTES
Integrative Oncology Symptom Management Clinic: Reiki Visit 3    Session Start   Date: 08.14.2024  Time: 0935    Session End   Date: 08.14.2024  Time: 1020    Assessment of patient:  Identified patient via full name and date of birth  Arrived ambulatory, steady gait, no assistive device   Last session: she noted the benefit was relaxation, stress reduction  She noted it is easier to heal when relaxed  She noted she is in remission from Non-Hodgkins Lymphoma, noted her immunity is low  She noted that she feels better after Dr. Nannette Boyd provided chiropractic care last week: back, hip, thigh feels better  Fatigue related to lack of sleep  She expressed an interest in neuromuscular therapy; will have our [facility]  reach out to patient to schedule an appointment; she has been informed that this modality is not a part of the Integrative Oncology Symptom Management Clinic  She received massage therapy via the [facility] Oncology [facility] Management Clinic    WellbeingPre 8  CopingPre 8  Pain Pre 3  TirednessPre 3  AnxietyPre 3  DepressionPre 3  StressPre 4  NauseaPre 2    Session goals:   To promote wellbeing, relief from pain, fatigue, anxiety, stress, and nausea    Patient Intention:   To be optimistic/continue healing    Provider Intention:   Patient's highest and greatest healing and wellbeing     Reiki procedure    Hand positions:   Supine Only:   Hands On: head: frontal, temporal, occiput, posterior neck, anterior and posterior shoulders, elbows, hands, mid-back, lower back, lateral hips, knees, ankles, dorsal and planter surfaces of feet  Hands Off/Hovering: anterior chest abdomen, pelvic region     Duration of session (minutes): 32    Hands on or hands off  [] hands on only  [] hands off only  [x] both     Were you interrupted during the session? No    Was music used during the session? Yes    What music recording was used?   Spotify: Spa Machine    Did the patient talk with you  during the Reiki session? Yes  For half the session    Did the patient fall asleep during the Reiki session? No    Do you have any comments about the session, such as the patient's reaction, or your experience during the Reiki?   Patient rested with eyes closed, relaxed facial and body posture, and deep, even breathing     Do you feel that the patient benefited from the treatment today? Yes  Noted she felt relaxed  Wellbeing, coping, pain, anxiety and stress, depression, fatigue, and nausea improved  WellbeingPost 9  CopingPost 9  PainPost 2  TirednessPost 2  AnxietyPost 2  DepressionPost 2  StressPost 2  NauseaPost 1    She would like to continue Reiki sessions    Scheduled for:  Integrative Oncology Symptom Management Clinic: Massage Therapy: 08.26, 09.04  Integrative Oncology Symptom Management Clinic: Reiki: to be scheduled  Integrative Oncology Consult: Dr. Hudson Ly: 08.28  Chiropractic: Dr. Nannette Boyd: 08.29, 09.05, 09.12.2024    Note signed by Nancy Menendez LMT on 08/14/24

## 2024-08-19 ENCOUNTER — APPOINTMENT (OUTPATIENT)
Dept: INTEGRATIVE MEDICINE | Facility: CLINIC | Age: 69
End: 2024-08-19
Payer: COMMERCIAL

## 2024-08-21 ENCOUNTER — APPOINTMENT (OUTPATIENT)
Dept: INTEGRATIVE MEDICINE | Facility: CLINIC | Age: 69
End: 2024-08-21
Payer: COMMERCIAL

## 2024-08-26 ENCOUNTER — APPOINTMENT (OUTPATIENT)
Dept: INTEGRATIVE MEDICINE | Facility: CLINIC | Age: 69
End: 2024-08-26
Payer: COMMERCIAL

## 2024-08-26 DIAGNOSIS — F32.A ANXIETY AND DEPRESSION: ICD-10-CM

## 2024-08-26 DIAGNOSIS — F51.04 PSYCHOPHYSIOLOGICAL INSOMNIA: ICD-10-CM

## 2024-08-26 DIAGNOSIS — M99.02 SOMATIC DYSFUNCTION OF THORACIC REGION: ICD-10-CM

## 2024-08-26 DIAGNOSIS — M79.18 MYALGIA, OTHER SITE: ICD-10-CM

## 2024-08-26 DIAGNOSIS — M54.16 LUMBAR RADICULOPATHY: ICD-10-CM

## 2024-08-26 DIAGNOSIS — M99.04 SACROILIAC JOINT SOMATIC DYSFUNCTION: ICD-10-CM

## 2024-08-26 DIAGNOSIS — M99.01 SOMATIC DYSFUNCTION OF CERVICAL REGION: ICD-10-CM

## 2024-08-26 DIAGNOSIS — M54.2 CERVICALGIA: Primary | ICD-10-CM

## 2024-08-26 DIAGNOSIS — M54.59 OTHER LOW BACK PAIN: ICD-10-CM

## 2024-08-26 DIAGNOSIS — M72.2 PLANTAR FASCIITIS OF RIGHT FOOT: ICD-10-CM

## 2024-08-26 DIAGNOSIS — F41.9 ANXIETY AND DEPRESSION: ICD-10-CM

## 2024-08-26 DIAGNOSIS — M72.2 PLANTAR FASCIITIS, BILATERAL: ICD-10-CM

## 2024-08-26 PROCEDURE — 97140 MANUAL THERAPY 1/> REGIONS: CPT | Performed by: HOSPITALIST

## 2024-08-26 NOTE — PROGRESS NOTES
Massage Therapy Visit:     Whitley Cohn was referred by Dr. Ly.    Condition of Client Subjective :  Patient ID: Whitley Cohn is a 69 y.o. female who presents for a 40 minute Daniel Therapy.  Patient is being treated for Non-Hodgkin's lymphoma.  Patient stated that she is having more discomfort in her neck, shoulders and back.  I focused on those areas.  I checked in with this patient often.  She did notice an improvement with her muscle discomfort.       Session Information  Visit Type: Follow-up visit  Description of present complaint: Discomfort, Muscle tension  Since Last Visit, Patient Noted Improved: Discomfort        Objective   Pre-treatment Assessment  Arrival Mode: Ambulatory  Pain Score: 5 - Moderate pain  Anxiety Level (0-10): 5  Stress Level (0-10): 6  Coping Level (0-10): 6  Depression Level (0-10): 6  Fatigue Level (0-10): 5  Nausea Level (0-10): 2  Wellbeing Level (0-10): 4        Actions Assessment/Plan :  Provider reviewed plan for the massage session, precautions and contraindications. Patient/guardian/hospital staff has given consent to treat with full understanding of what to expect during the session. Before massage therapy began, provider explained to the patient to communicate at any time if the pressure was causing discomfort past their tolerance level. Patient agreed to advise therapist.    Massage Treatment  Patient Position: Table  Positioning Assistance: Did not need assistance  Massage Technique: Relaxation massage  Pressure Scale: 2 - Mild pressure, 3 - Medium pressure    Response:  Post-treatment Assessment  Anxiety Level (0-10): 3  Stress Level (0-10): 3  Coping Level (0-10): 9  Depression Level (0-10): 2  Fatigue Level (0-10): 3  Nausea Level (0-10): 1  Wellbeing Level (0-10): 1    Evaluation:   Patient will follow up as needed.

## 2024-08-28 ENCOUNTER — APPOINTMENT (OUTPATIENT)
Dept: INTEGRATIVE MEDICINE | Facility: CLINIC | Age: 69
End: 2024-08-28
Payer: COMMERCIAL

## 2024-08-28 DIAGNOSIS — F51.04 PSYCHOPHYSIOLOGICAL INSOMNIA: ICD-10-CM

## 2024-08-28 DIAGNOSIS — F32.A ANXIETY AND DEPRESSION: ICD-10-CM

## 2024-08-28 DIAGNOSIS — M54.16 LUMBAR RADICULOPATHY: ICD-10-CM

## 2024-08-28 DIAGNOSIS — M99.02 SOMATIC DYSFUNCTION OF THORACIC REGION: ICD-10-CM

## 2024-08-28 DIAGNOSIS — F41.9 ANXIETY AND DEPRESSION: ICD-10-CM

## 2024-08-28 DIAGNOSIS — M79.18 MYALGIA, OTHER SITE: ICD-10-CM

## 2024-08-28 DIAGNOSIS — M54.59 OTHER LOW BACK PAIN: ICD-10-CM

## 2024-08-28 DIAGNOSIS — M99.01 SOMATIC DYSFUNCTION OF CERVICAL REGION: ICD-10-CM

## 2024-08-28 DIAGNOSIS — M99.04 SACROILIAC JOINT SOMATIC DYSFUNCTION: ICD-10-CM

## 2024-08-28 DIAGNOSIS — M72.2 PLANTAR FASCIITIS, BILATERAL: ICD-10-CM

## 2024-08-28 DIAGNOSIS — M72.2 PLANTAR FASCIITIS OF RIGHT FOOT: ICD-10-CM

## 2024-08-28 DIAGNOSIS — M54.2 CERVICALGIA: Primary | ICD-10-CM

## 2024-08-28 PROCEDURE — 99214 OFFICE O/P EST MOD 30 MIN: CPT | Performed by: HOSPITALIST

## 2024-08-28 NOTE — PROGRESS NOTES
Patient ID: Whitley Cohn is a 69 y.o. female.  Referring Physician: No referring provider defined for this encounter.  Primary Care Provider: Eduardo Piña MD     CANCER HISTORY:   70 yo woman with h/o NHL (2006) stage IV              Chemotherapy ([facility]) at [facility] with CVP + ritux, then maint ritux - remission     Concerns:  Immune system function - often had pneumonia              Had pneumovax last 2016 - should d/w PCP     INTEGRATIVE HISTORY:  Seen by pain and Dr. Cano     Symptoms:  Completed reiki sessions and went to PT for imbalance and chronic low back pain  Felt the reiki and massage therapy was going well. [place] [facility] was helping with balance.   Haven't found a neuromuscular therapist, asking for a referral   Reporting improvement of lower back pain   Ongoing symptom of tightness/numbness in B/L feet, radiating to shins   Scrambler therapy for neuropathy    Starting with chiropractic     Anxiety - related to symptoms     Chronic LBP with bilat sciatica - seeing pain mgmt  Pinched nerve (stenosis) in L4/5, scoliosis              Worse since 10/23              Causes numbness, spasms in shins and glutes              Saw neuromuscular therapist 12 yrs ago              12 years ago had a flare up - then had injections, didn't help - had foot injury at the time, out of alignment              Occ tramadol very intermittently, does not take advil and causes pain in stomach, tylenol doesn't work well              Saw functional medicine doctor - sugg naltrexone in compounding pharmacy - did not help yet     Diet: overall good. Avoid processed foods   Doesn't eat red meat     PA: walk but not as much as usual, going to a fitness center and doing machine weights  Yoga once/weekly  Adding pilates once/weekly for balance     Sleep: not as well as usual. Waking up at 4-4:30 AM.      Stress: usually with exercise  Tries to do breathing exercises, not helping much  Pain and worry about those symptoms -  has seen ortho     Natural Products:    Naltrexone  Vit C  B complex  ALA   Mg citrate   Biotin  Vit D3  Gelatin   Coq10  Fish oil  Plant stanyl shelley  Collagen  Probiotic  Eye vitamin - lutein and xanthin  Glucosamin/chondroitin  L-methyl folate     ROS:  no ha, visual symptoms, hearing loss  no sob, chest pain, palp  ROS o/w non contributory, please see HPI        Objective  BSA: There is no height or weight on file to calculate BSA.  There were no vitals taken for this visit.     PHYSICAL EXAM:  An interactive audio and video telecommunication system which permits real time communications between the patient (at the originating site) and provider (at the distant site) was utilized to provide this telehealth service.    Verbal consent was requested and obtained on this date for a telehealth visit.        Assessment/Plan  68 yo woman with h/o NHL (2006) stage IV              Chemotherapy ([facility]) at [facility] with CVP + ritux, then maint ritux - remission     CANCER SPECIFIC RECCS:  LIFESTYLE:  Diet - 5-9 fruits/veg/day (7 veg to 2 fruits)  Cruciferous Vegetables - Brussel Sprouts, Kale, Broccoli, Cauliflower  Plant based anti-inflammatory whole foods diet  AICR New American Plate  PHYSICAL ACTIVITY 30 MIN/DAY     Reading:  An[employer] Living  Cancer Fighting Kitchen     Websites:  Cook for your Life  [email or web address]     SYMPTOM MANAGEMENT:  Cancer Related Pain:  Consider the cause of pain and collaborative care     Integrative Modalities to consider:  Completed reiki sessions and went to PT for imbalance and chronic low back pain  Felt the reiki and massage therapy was going well. [facility] was helping with balance.   Haven't found a neuromuscular therapist, asking for a referral   Reporting improvement of lower back pain   Ongoing symptom of tightness/numbness in B/L feet, radiating to shins   Scrambler therapy for neuropathy    Starting with chiropractic      Natural Products to Consider:  Arnica (Boiron)  tablets or topically (homeopathic)  Vitamin D3 2000 international units/day for joint pains     Consider: Lidoderm patch - consider but not having acute pain     Anxiety:  Integrative Modalities to consider:  Massage Therapy  Acupuncture - doesn't help  Reiki   Music Therapy (including [facility] Tracks), expressive arts     Natural Products to Consider:  Ashwaghanda (Cristin)  Essential oils: Consider Lavender in a diffuser     Naltrexone  Vit C  B complex  ALA   B pollen - would stop  Mg citrate  Biotin - +/-  Vit D3  Gelatin - would not  Coq10  Fish oil  Plant stanyl shelley  Collagen - would not  Probiotic  Eye vitamin - lutein and xanthin  Glucosamin/chondroitin  L-methyl folate     Follow up:  2-3 months     Thank you for consulting me in for this patient  Hudson Ly MD

## 2024-08-29 ENCOUNTER — APPOINTMENT (OUTPATIENT)
Dept: INTEGRATIVE MEDICINE | Facility: CLINIC | Age: 69
End: 2024-08-29
Payer: COMMERCIAL

## 2024-08-29 DIAGNOSIS — M99.04 SACROILIAC JOINT SOMATIC DYSFUNCTION: ICD-10-CM

## 2024-08-29 DIAGNOSIS — M72.2 PLANTAR FASCIITIS, BILATERAL: ICD-10-CM

## 2024-08-29 DIAGNOSIS — M99.05 SEGMENTAL AND SOMATIC DYSFUNCTION OF PELVIC REGION: Primary | ICD-10-CM

## 2024-08-29 PROCEDURE — 97112 NEUROMUSCULAR REEDUCATION: CPT | Performed by: CHIROPRACTOR

## 2024-08-29 PROCEDURE — 98940 CHIROPRACT MANJ 1-2 REGIONS: CPT | Performed by: CHIROPRACTOR

## 2024-08-29 NOTE — PROGRESS NOTES
Subjective   Patient ID: Whitley Cohn is a 69 y.o. female who presents August 29, 2024 for chiropractic care.    2/25 VPCY    Today, the patient rates their degree of pain as a 4 out of 10 on the numeric pain rating scale.     HPI : Whitley returns to my office for chiropractic care. Reports she tolerated her initial treatment well and without issue. She notes intermittent discomfort in the lower back and continued tension within the shins, calves and feet. She has continued with physical therapy, reiki and massage regularly with benefit. Denies other changes to health.    _______________________________________________________  INITIAL HISTORY (08/09/2024):  Whitley presents to my office today for low force chiropractic and soft tissue work. She has been dealing with ongoing intermittent foot pain, tightness related to plantar fascitis and numbness potentially related to lumbar stenosis. She reports that her right foot has been in a walking boot twice over the years and has created some postural imbalances as a result. She notes a sharp discomfort along the right lower gluteal region and lateral hip. She has found physical therapy, massage and reiki to be helpful in managing discomfort. Over 10 years ago she saw a neuromuscular specialist who performed a lot of soft tissue work that seemed to provide significant relief. She is not interested in any manual chiropractic adjustment, as she has had adverse responses in the past.      Objective   Physical Exam  Neurological:      General: No focal deficit present.      Mental Status: She is alert and oriented to person, place, and time.      Cranial Nerves: No dysarthria or facial asymmetry.      Sensory: Sensation is intact.      Motor: Motor function is intact.      Coordination: Coordination is intact.      Gait: Gait is intact. Gait normal.         Palpation of the following region(s) revealed:      Lumbar: Quadratus lumborum right, muscular  hypertonicity.  Gluteal right, muscular hypertonicity.    Lower Extremity: Gastrocnemius / Soleus bilateral, hypertonicity and tenderness.  Tibialis anterior bilateral, hypertonicity and tenderness.    Segmental Joint(s): Segmental joint dysfunction was assessed with motion palpation and is identified in the following areas:  Lumbopelvic / Sacral SIJ : R SIJ and L SIJ      Assessment/Plan   Today's Treatment Included: Chiropractic manipulation to the  Segmental Joint(s) Lumbopelvic/Sacral SIJ : R SIJ and L SIJ    Treatment Techniques Used : Pelvic blocking technique and Low force  Neuromuscular Re-Education (81368): Start time: 8:00 am End time: 8:15 am  1 Units    Soft-tissue mobilization was performed in the following areas:       Gluteal mm. Glute. Med. right  Gluteal mm. Glute. Med. bilateral, Psoas bilateral, and Adductors bilateral  Tibialis Anterior bilateral and Calf mm. bilateral        F/U in 2 weeks    The patient tolerated today's treatment with little or no additional discomfort and was instructed to contact the office for questions or concerns.

## 2024-09-04 ENCOUNTER — APPOINTMENT (OUTPATIENT)
Dept: INTEGRATIVE MEDICINE | Facility: CLINIC | Age: 69
End: 2024-09-04
Payer: COMMERCIAL

## 2024-09-04 DIAGNOSIS — M99.01 SOMATIC DYSFUNCTION OF CERVICAL REGION: ICD-10-CM

## 2024-09-04 DIAGNOSIS — M54.2 CERVICALGIA: ICD-10-CM

## 2024-09-04 DIAGNOSIS — M72.2 PLANTAR FASCIITIS OF RIGHT FOOT: ICD-10-CM

## 2024-09-04 DIAGNOSIS — M99.04 SACROILIAC JOINT SOMATIC DYSFUNCTION: ICD-10-CM

## 2024-09-04 DIAGNOSIS — M99.05 SEGMENTAL AND SOMATIC DYSFUNCTION OF PELVIC REGION: ICD-10-CM

## 2024-09-04 DIAGNOSIS — M72.2 PLANTAR FASCIITIS, BILATERAL: ICD-10-CM

## 2024-09-04 DIAGNOSIS — F51.04 PSYCHOPHYSIOLOGICAL INSOMNIA: ICD-10-CM

## 2024-09-04 DIAGNOSIS — M54.16 LUMBAR RADICULOPATHY: ICD-10-CM

## 2024-09-04 DIAGNOSIS — M54.59 OTHER LOW BACK PAIN: Primary | ICD-10-CM

## 2024-09-04 DIAGNOSIS — M99.02 SOMATIC DYSFUNCTION OF THORACIC REGION: ICD-10-CM

## 2024-09-04 DIAGNOSIS — M79.18 MYALGIA, OTHER SITE: ICD-10-CM

## 2024-09-04 PROCEDURE — 97140 MANUAL THERAPY 1/> REGIONS: CPT | Performed by: HOSPITALIST

## 2024-09-04 ASSESSMENT — PAIN SCALES - GENERAL: PAINLEVEL_OUTOF10: 4

## 2024-09-04 NOTE — PROGRESS NOTES
Massage Therapy Visit:     Whitley Cohn was referred by Dr. Ly.    Condition of Client Subjective :  Patient ID: Whitley Cohn is a 69 y.o. female who presents for a 40 minute Daniel Therapy.  Patient is being treated for non-hodgkin's lymphoma.  Patient stated that her calf's are tight. Patient stated that right calf is tighter than the left calf.   I focused on her neck, shoulders, back and legs and calf's. I checked in with this patient often.  She did notice an improvement with her muscle discomfort.         Session Information  Visit Type: Follow-up visit  Description of present complaint: Discomfort, Muscle tension        Objective   Pre-treatment Assessment  Arrival Mode: Ambulatory  Pain Score: 6  Anxiety Level (0-10): 5  Stress Level (0-10): 4  Coping Level (0-10): 7  Depression Level (0-10): 4  Fatigue Level (0-10): 5  Nausea Level (0-10): 5  Wellbeing Level (0-10): 4        Actions Assessment/Plan :  Provider reviewed plan for the massage session, precautions and contraindications. Patient/guardian/hospital staff has given consent to treat with full understanding of what to expect during the session. Before massage therapy began, provider explained to the patient to communicate at any time if the pressure was causing discomfort past their tolerance level. Patient agreed to advise therapist.    Massage Treatment  Patient Position: Table  Positioning Assistance: Did not need assistance  Massage Technique: Relaxation massage  Pressure Scale: 2 - Mild pressure, 3 - Medium pressure    Response:  Post-treatment Assessment  Patient Noted Improvement of the Following Symptoms: Muscle tension  0-10 (Numeric) Pain Score: 4  Anxiety Level (0-10): 4  Stress Level (0-10): 4  Coping Level (0-10): 7  Depression Level (0-10): 4  Fatigue Level (0-10): 5  Nausea Level (0-10): 6  Wellbeing Level (0-10): 3    Evaluation:    Patient will follow up as needed.

## 2024-09-05 ENCOUNTER — DOCUMENTATION (OUTPATIENT)
Dept: OTHER | Age: 69
End: 2024-09-05

## 2024-09-05 ENCOUNTER — APPOINTMENT (OUTPATIENT)
Dept: INTEGRATIVE MEDICINE | Facility: CLINIC | Age: 69
End: 2024-09-05
Payer: COMMERCIAL

## 2024-09-05 DIAGNOSIS — M99.06 SEGMENTAL AND SOMATIC DYSFUNCTION OF LOWER EXTREMITY: ICD-10-CM

## 2024-09-05 DIAGNOSIS — M72.2 PLANTAR FASCIITIS, BILATERAL: Primary | ICD-10-CM

## 2024-09-05 PROCEDURE — 97112 NEUROMUSCULAR REEDUCATION: CPT | Performed by: CHIROPRACTOR

## 2024-09-05 PROCEDURE — 98943 CHIROPRACT MANJ XTRSPINL 1/>: CPT | Performed by: CHIROPRACTOR

## 2024-09-05 NOTE — PROGRESS NOTES
"Music Therapy Note    Whitley Cohn    Therapy Session  Referral Type:  ([facility] Consult)  Visit Type: New visit  Session Start Time: 1310  Session End Time: 1332  Intervention Delivery: Virtual visit  Conflict of Service: None  Family Present for Session: None     Pre-assessment  Unable to Assess Reason: Outcomes not applicable  Mood/Affect: Appropriate, Calm, Cooperative         Treatment/Interventions  Music Therapy Interventions: Assessment  Interruption: No  Patient Fell Asleep at End of Session: No    Post-assessment  Unable to Assess Reason: Did not provide expressive therapy intervention  Mood/Affect: Appropriate, Calm, Cooperative  Continue Visiting: Yes  Total Session Time (min): 22 minutes    Narrative  Assessment Detail: MT called pt to follow-up on a [facility] referral. Patient answered the call and stated that they were feeling well this day. Patient agreed to an assessment conversation at this time. Patient shared that she is in remission from her cancer, but the chemotherapy left lasting health effects. Patient is interested in integrative health treatments. Patient's hobbies/interests include walking, spending time with her family, being in nature, and staying active. Patient also shared that she is originally from [facility] and spent 30 years in [place] before moving to [place] nine years ago.  Plan: MT engaged pt in an assessment of needs and preferences for their own personalized guided meditation track.  Intervention: Patient participated in conversation and identified needs as: stress reduction and management, as she notices stress exacerbates her physical symptoms. Patient stated that she needs \"relaxation and healing.\" Patient's music preferences include:  not any particular style of music, but she enjoys classical, nature sounds, and spa-like music. Patient requested that the following instruments be considered for inclusion in the final product: rain stick, ocean drum, piano, " nature sounds, and possibly guitar.  Follow-up: MT to continue to follow via providing pt with her personalized track. MT to begin writing and recording process.    Education Documentation  No documentation found.

## 2024-09-05 NOTE — PROGRESS NOTES
Subjective   Patient ID: Whitley Cohn is a 69 y.o. female who presents September 5, 2024 for chiropractic care.    3/25 VPCY    Today, the patient rates their degree of pain as a 4 out of 10 on the numeric pain rating scale.     HPI : Whitley returns to my office for chiropractic care. She arrives today with ongoing tension and tightness to the lateral aspects of the calves and into the heels. She notes continued plantar fascial discomfort, right greater than left. She has continued to work with massage therapy, reiki and home exercises with benefit. Denies other changes to health.    _______________________________________________________  INITIAL HISTORY (08/09/2024):  Whitley presents to my office today for low force chiropractic and soft tissue work. She has been dealing with ongoing intermittent foot pain, tightness related to plantar fascitis and numbness potentially related to lumbar stenosis. She reports that her right foot has been in a walking boot twice over the years and has created some postural imbalances as a result. She notes a sharp discomfort along the right lower gluteal region and lateral hip. She has found physical therapy, massage and reiki to be helpful in managing discomfort. Over 10 years ago she saw a neuromuscular specialist who performed a lot of soft tissue work that seemed to provide significant relief. She is not interested in any manual chiropractic adjustment, as she has had adverse responses in the past.      Objective   Physical Exam  Neurological:      General: No focal deficit present.      Mental Status: She is alert and oriented to person, place, and time.      Cranial Nerves: No dysarthria or facial asymmetry.      Sensory: Sensation is intact.      Motor: Motor function is intact.      Coordination: Coordination is intact.      Gait: Gait is intact. Gait normal.         Palpation of the following region(s) revealed:  Lumbar: Lumbar paraspinals bilateral, muscular  hypertonicity.    Lower Extremity: Gastrocnemius / Soleus bilateral, hypertonicity and tenderness.  Tibialis anterior bilateral, hypertonicity and tenderness.  Plantar fascia right, muscular hypertonicity.    Segmental Joint(s): Segmental joint dysfunction was assessed with motion palpation and is identified in the following areas:  Upper / Lower Extremities : R Talocrural and L Talocrural      Assessment/Plan   Today's Treatment Included: Chiropractic manipulation to the    Segmental Joints Upper/Lower Extremities : R Talocrural and L Talocrural  Treatment Techniques Used : Low force and Other: isolated mobilizations  Neuromuscular Re-Education (89389): Start time: 8:00 am End time: 8:15 am  1 Units    Soft-tissue mobilization was performed in the following areas:   Tibialis Anterior bilateral, Calf mm. bilateral, and Achilles bilateral    Demonstrated table top rocking from plantar fascial stretch to calf stretch    F/U in 2 weeks    The patient tolerated today's treatment with little or no additional discomfort and was instructed to contact the office for questions or concerns.

## 2024-09-06 NOTE — PROGRESS NOTES
[Referred by a friend]    Chief complaint: Low back pain    Dear Dr. Man,    I had the pleasure of seeing your patient, Whitley Cohn, in clinic today. As you know, She is a pleasant 69 y.o. right handed woman with past medical history significant for  HTN, HLD, HTN, HLD, history of non-Hodgkin's lymphoma 2006 s/p chemo, anxiety, who presents for evaluation of low back pain.    TIMELINE OF COMPLAINT(S):    Her current symptoms began around October 2023.  In June 2023, she was dealing with a bout of plantar fasciitis, her podiatrist had her sit in an ankle dorsiflexion splint for 1 hour/day, and she started feeling numbness in her left foot.  After she finished the treatment for plantar fasciitis, the numbness persisted and did not go away with standing up.  She went to her chiropractor, and this did not help.  Then she started feeling the symptoms in both feet, in her shins and calves and at times a pinching and pulling sensation in bilateral buttocks and lower back, alternating right and left.  She feels that the back symptoms are connected to the feet symptoms.  At this point however, the symptoms in the feet is much more bothersome than the back pain, at 90%/10% respectively.    Her current feet symptoms are not reminiscent of her plantar fasciitis pain, that has since healed.    12 years ago, she also had another plantar fasciitis flareup and was in a cast for 10 months before transitioning to a brace.  When this happened, she also had similar spasms in her buttock, but no symptoms in her feet at the time.  Physical therapy and epidural steroid injection in the spine did not help, but she found a neuromuscular massage therapist in [place] which helped significantly and she did not have any more pain or symptoms until this past year.    Over time, sometimes she feels that the symptoms are improving, for example, yesterday she felt 90% better, but then some days she feels like it is worse again, she does  not know what is a trigger.  The tingling is not so bad, but the tight feeling is much worse.    She is very hesitant to try any medications or injections.    She had a lumbar MRI done recently at an outside facility.  She brought a CD with her, but I am not able to open up those images.  I was able to open up the images of the lumbar MRI CD from [place] in 2011 which showed severe stenosis L5-S1 and L4-5, and reportedly most recent lumbar MRI is unchanged.    Pain:  LOCATION- Lower back R and L, lower buttocks, legs and feet at plantar aspects R>L, calf/shin too.  RADIATION-  CONSTANT or INTERMITTENT- Intermittent , bothersome at night too   SEVERITY/QUANTITY- 6  QUALITY- dull and pressure  WEAKNESS- No  NUMBNESS/TINGLING- Yes, feet and lower legs  ASSOCIATED WITH- no falls  EXACERBATED BY- walking  BETTER WITH- Sleep at times  TRIED- Tylenol doent help      Anti-Inflammatories: Diclofenac gel on list but didn't try previously tumeric didn't help, diclofenac, Celebrex (caused GI SE's)      Muscle relaxants: Previously Robaxin, took a few doses didn't help       Anti-depressants: Previously amitriptyline, duloxetine SE's  and didn't help      Neuroleptics: Previously gabapentin didn't help, only tried for a week      LDN:    PHYSICAL THERAPY: Yes, currently, 10k steps daily, weights and cardio machines 4-5 times per week for 1 hour, pool water walking and decompression twice a week, yoga once a week, once in a while rachelle. PT on and off and those HEP   CHIROPRACTIC MANIPULATION: Yes didn't help  TENS unit: No  ACUPUNCTURE TREATMENTS: Yes, didn't help,  DEEP TISSUE MASSAGE THERAPY: Yes only for the back  OSTEOPATHIC MANIPULATION THERAPY: Yes at [facility], didn't help    EMG/NCS: No    INJECTIONS:   -PETR's in [place] 12 yrs ago, didn't help  -Plantar fascitis injections didn't help      IMAGING: Yes    Thoracic xray 5/21/24:  Impression:     Severe multilevel degenerative disc disease.  No fractures.     Lumbar MRI and  xrays OSH not available, 4/12/24    Lumbar MRI 11/1/23:  IMPRESSION:     Lumbar spondylosis as described, worst at L4-5 with severe canal   narrowing.  Mild redundancy of cauda equina adjacent to the L4-5 level,   which may indicate significant stenosis.     Anatomic Lumbar Variant: None.  L4-5 is considered the level of the iliac   crest and assume there are 5 lumbar-type vertebrae.       Lab Results   Component Value Date    HGBA1C 4.8 10/11/2023       FUNCTIONAL HISTORY: The patient is independent in all ADLs, mobility, and driving. The patient does not use any assistive device.    SH:  Lives in: [facility]  Lives with: Spouse  Occupation: Retired  Tobacco: No  Alcohol: No  Drugs: No  __________________________________    ROS: The patient denies any bowel or bladder incontinence/accidents, night sweats, fevers, chills, recent significant weight loss. A 14 point review of systems was reviewed with the patient and is as above and otherwise negative.  ROS questionnaire positive for numbness/tingling, poor balance, difficulty walking, muscle pain/tightness/spasms, back pain, swelling    Physical Exam  Constitutional:           Comments: Lower back and leg pain.   Musculoskeletal:        Feet:    Feet:      Comments: Feet pain.         PHYSICAL EXAM    Pt refused BP check    GEN - Alert, well-developed, well-nourished, no acute distress  PSYCH - Cooperative, appropriate mood and affect  HEENT - NC/AT  RESP - Non-labored respirations, equal expansion  CV - warm and well-perfused, No cyanosis or edema in extremities.   ABD- soft, ND  SKIN - No rash.    BACK/SPINE -scoliosis with right thoracic prominence no erythema, edema, or swelling.  Nearly full lumbar forward flexion, diminished extension, diminished sidebend bilaterally, normal rotation bilaterally without provocation of pain.  There was tenderness over the bilateral gluteal muscles, but no tenderness over the lumbar paraspinal muscles or the SI joints.  Negative  sitting slump test bilaterally    Patient was not able to do single-leg sit to stand on either side    NEURO -   LE strength 5/5 -  including hip flexors (4+/5 bilaterally), knee flexors, knee extensors, ankle dorsiflexors, ankle plantar flexors, and EHL   Sensation - intact to light touch in bilateral lower extremities.  Diminished to pinprick in the right lower leg more than the left lower leg.  Proprioception intact bilaterally.  Reflexes - 2+ patellar and absent Achilles reflexes bilaterally GAIT - Normal base, normal stride length, non-antalgic. Able to toe walk and heel walk without difficulty.     IMPRESSION:    This  is a pleasant 69 y.o. right handed woman with past medical history significant for  HTN, HLD, HTN, HLD, history of non-Hodgkin's lymphoma, anxiety, who presents for evaluation of low back pain.  Physical exam is reassuring for lack of neurologic compromise.  Symptoms and physical exam findings likely due to severe lumbar stenosis, but peripheral neuropathy is on the differential as well.    -Patient Education: Extensive time was spent educating the patient on relevant anatomy, clinical findings and imaging, as well as discussing the potential diagnoses as discussed above.   -Try Tumeric 1000 mg per day +curcumin daily, this is over-the-counter  -Take vitamin D 5000 units daily, this is over-the-counter  -consider compound cream  -Consider other medications of gabapentin, Lyrica, nortriptyline, Cymbalta, Topamax   -vitamin B1 and D check ordered  -Consider EMG test in the future  -Try contrast baths, handout provided  -Consider trigger point injections, referral for epidural steroid injections  -Consider referral to surgical spine  -Physical therapy referral for desensitization therapy provided  -Consider TENS unit socks  -Read a book called Back Sense  -Keep a diary/log of your pain symptoms over the next month as discussed  -Follow-up in 6 to 8 weeks          The patient expressed understanding  and agreement with the assessment and plan. Patient encouraged to contact us should they have any questions, concerns, or any changes in symptoms.     Thank you for allowing me to participate in the care of your patient.      ** Dictated with voice recognition software, please forgive any errors in grammar and/or spelling **

## 2024-09-09 ENCOUNTER — APPOINTMENT (OUTPATIENT)
Dept: PHYSICAL MEDICINE AND REHAB | Facility: CLINIC | Age: 69
End: 2024-09-09
Payer: COMMERCIAL

## 2024-09-09 VITALS
WEIGHT: 159 LBS | HEART RATE: 73 BPM | OXYGEN SATURATION: 95 % | HEIGHT: 65 IN | BODY MASS INDEX: 26.49 KG/M2 | TEMPERATURE: 97.7 F

## 2024-09-09 DIAGNOSIS — M79.2 NEUROPATHIC PAIN: Primary | ICD-10-CM

## 2024-09-09 PROCEDURE — 1159F MED LIST DOCD IN RCRD: CPT | Performed by: PHYSICAL MEDICINE & REHABILITATION

## 2024-09-09 PROCEDURE — 3008F BODY MASS INDEX DOCD: CPT | Performed by: PHYSICAL MEDICINE & REHABILITATION

## 2024-09-09 PROCEDURE — 1160F RVW MEDS BY RX/DR IN RCRD: CPT | Performed by: PHYSICAL MEDICINE & REHABILITATION

## 2024-09-09 PROCEDURE — 1125F AMNT PAIN NOTED PAIN PRSNT: CPT | Performed by: PHYSICAL MEDICINE & REHABILITATION

## 2024-09-09 PROCEDURE — 99205 OFFICE O/P NEW HI 60 MIN: CPT | Performed by: PHYSICAL MEDICINE & REHABILITATION

## 2024-09-09 ASSESSMENT — PAIN SCALES - GENERAL: PAINLEVEL: 6

## 2024-09-09 NOTE — LETTER
September 9, 2024     Thien Man MD  [address] [facility] [id number]    Patient: Whitley Cohn   YOB: 1955   Date of Visit: 9/9/2024       Dear Dr. Thien Man MD:    Thank you for referring Whitley Cohn to me for evaluation. Below are my notes for this consultation.  If you have questions, please do not hesitate to call me. I look forward to following your patient along with you.       Sincerely,     Barbie Nelson MD      CC: No Recipients  ______________________________________________________________________________________    [Referred by a friend]    Chief complaint: Low back pain    Dear Dr. Piña,    I had the pleasure of seeing your patient, Whitley Cohn, in clinic today. As you know, She is a pleasant 69 y.o. right handed woman with past medical history significant for  HTN, HLD, HTN, HLD, history of non-Hodgkin's lymphoma 2006 s/p chemo, anxiety, who presents for evaluation of low back pain.    TIMELINE OF COMPLAINT(S):    Her current symptoms began around October 2023.  In June 2023, she was dealing with a bout of plantar fasciitis, her podiatrist had her sit in an ankle dorsiflexion splint for 1 hour/day, and she started feeling numbness in her left foot.  After she finished the treatment for plantar fasciitis, the numbness persisted and did not go away with standing up.  She went to her chiropractor, and this did not help.  Then she started feeling the symptoms in both feet, in her shins and calves and at times a pinching and pulling sensation in bilateral buttocks and lower back, alternating right and left.  She feels that the back symptoms are connected to the feet symptoms.  At this point however, the symptoms in the feet is much more bothersome than the back pain, at 90%/10% respectively.    Her current feet symptoms are not reminiscent of her plantar fasciitis pain, that has since healed.    12 years ago, she also had another plantar fasciitis  flareup and was in a cast for 10 months before transitioning to a brace.  When this happened, she also had similar spasms in her buttock, but no symptoms in her feet at the time.  Physical therapy and epidural steroid injection in the spine did not help, but she found a neuromuscular massage therapist in [place] which helped significantly and she did not have any more pain or symptoms until this past year.    Over time, sometimes she feels that the symptoms are improving, for example, yesterday she felt 90% better, but then some days she feels like it is worse again, she does not know what is a trigger.  The tingling is not so bad, but the tight feeling is much worse.    She is very hesitant to try any medications or injections.    She had a lumbar MRI done recently at an outside facility.  She brought a CD with her, but I am not able to open up those images.  I was able to open up the images of the lumbar MRI CD from [place] in 2011 which showed severe stenosis L5-S1 and L4-5, and reportedly most recent lumbar MRI is unchanged.    Pain:  LOCATION- Lower back R and L, lower buttocks, legs and feet at plantar aspects R>L, calf/shin too.  RADIATION-  CONSTANT or INTERMITTENT- Intermittent , bothersome at night too   SEVERITY/QUANTITY- 6  QUALITY- dull and pressure  WEAKNESS- No  NUMBNESS/TINGLING- Yes, feet and lower legs  ASSOCIATED WITH- no falls  EXACERBATED BY- walking  BETTER WITH- Sleep at times  TRIED- Tylenol doent help      Anti-Inflammatories: Diclofenac gel on list but didn't try previously tumeric didn't help, diclofenac, Celebrex (caused GI SE's)      Muscle relaxants: Previously Robaxin, took a few doses didn't help       Anti-depressants: Previously amitriptyline, duloxetine SE's  and didn't help      Neuroleptics: Previously gabapentin didn't help, only tried for a week      LDN:    PHYSICAL THERAPY: Yes, currently, 10k steps daily, weights and cardio machines 4-5 times per week for 1 hour,  pool water walking and decompression twice a week, yoga once a week, once in a while rachelle. PT on and off and those HEP   CHIROPRACTIC MANIPULATION: Yes didn't help  TENS unit: No  ACUPUNCTURE TREATMENTS: Yes, didn't help,  DEEP TISSUE MASSAGE THERAPY: Yes only for the back  OSTEOPATHIC MANIPULATION THERAPY: Yes at [facility], didn't help    EMG/NCS: No    INJECTIONS:   -PETR's in [place] 12 yrs ago, didn't help  -Plantar fascitis injections didn't help      IMAGING: Yes    Thoracic xray 5/21/24:  Impression:     Severe multilevel degenerative disc disease.  No fractures.     Lumbar MRI and xrays OSH not available, 4/12/24    Lumbar MRI 11/1/23:  IMPRESSION:     Lumbar spondylosis as described, worst at L4-5 with severe canal   narrowing.  Mild redundancy of cauda equina adjacent to the L4-5 level,   which may indicate significant stenosis.     Anatomic Lumbar Variant: None.  L4-5 is considered the level of the iliac   crest and assume there are 5 lumbar-type vertebrae.       Lab Results   Component Value Date    HGBA1C 4.8 10/11/2023       FUNCTIONAL HISTORY: The patient is independent in all ADLs, mobility, and driving. The patient does not use any assistive device.    SH:  Lives in: [facility]  Lives with: Spouse  Occupation: Retired  Tobacco: No  Alcohol: No  Drugs: No  __________________________________    ROS: The patient denies any bowel or bladder incontinence/accidents, night sweats, fevers, chills, recent significant weight loss. A 14 point review of systems was reviewed with the patient and is as above and otherwise negative.  ROS questionnaire positive for numbness/tingling, poor balance, difficulty walking, muscle pain/tightness/spasms, back pain, swelling    Physical Exam  Constitutional:           Comments: Lower back and leg pain.   Musculoskeletal:        Feet:    Feet:      Comments: Feet pain.         PHYSICAL EXAM    Pt refused BP check    GEN - Alert, well-developed, well-nourished, no acute  distress  PSYCH - Cooperative, appropriate mood and affect  HEENT - NC/AT  RESP - Non-labored respirations, equal expansion  CV - warm and well-perfused, No cyanosis or edema in extremities.   ABD- soft, ND  SKIN - No rash.    BACK/SPINE -scoliosis with right thoracic prominence no erythema, edema, or swelling.  Nearly full lumbar forward flexion, diminished extension, diminished sidebend bilaterally, normal rotation bilaterally without provocation of pain.  There was tenderness over the bilateral gluteal muscles, but no tenderness over the lumbar paraspinal muscles or the SI joints.  Negative sitting slump test bilaterally    Patient was not able to do single-leg sit to stand on either side    NEURO -   LE strength 5/5 -  including hip flexors (4+/5 bilaterally), knee flexors, knee extensors, ankle dorsiflexors, ankle plantar flexors, and EHL   Sensation - intact to light touch in bilateral lower extremities.  Diminished to pinprick in the right lower leg more than the left lower leg.  Proprioception intact bilaterally.  Reflexes - 2+ patellar and absent Achilles reflexes bilaterally GAIT - Normal base, normal stride length, non-antalgic. Able to toe walk and heel walk without difficulty.     IMPRESSION:    This  is a pleasant 69 y.o. right handed woman with past medical history significant for  HTN, HLD, HTN, HLD, history of non-Hodgkin's lymphoma, anxiety, who presents for evaluation of low back pain.  Physical exam is reassuring for lack of neurologic compromise.  Symptoms and physical exam findings likely due to severe lumbar stenosis, but peripheral neuropathy is on the differential as well.    -Patient Education: Extensive time was spent educating the patient on relevant anatomy, clinical findings and imaging, as well as discussing the potential diagnoses as discussed above.   -Try Tumeric 1000 mg per day +curcumin daily, this is over-the-counter  -Take vitamin D 5000 units daily, this is  over-the-counter  -consider compound cream  -Consider other medications of gabapentin, Lyrica, nortriptyline, Cymbalta, Topamax   -vitamin B1 and D check ordered  -Consider EMG test in the future  -Try contrast baths, handout provided  -Consider trigger point injections, referral for epidural steroid injections  -Consider referral to surgical spine  -Physical therapy referral for desensitization therapy provided  -Consider TENS unit socks  -Read a book called Back Sense  -Keep a diary/log of your pain symptoms over the next month as discussed  -Follow-up in 6 to 8 weeks          The patient expressed understanding and agreement with the assessment and plan. Patient encouraged to contact us should they have any questions, concerns, or any changes in symptoms.     Thank you for allowing me to participate in the care of your patient.      ** Dictated with voice recognition software, please forgive any errors in grammar and/or spelling **

## 2024-09-09 NOTE — PATIENT INSTRUCTIONS
-Try Tumeric 1000 mg per day +curcumin daily, this is over-the-counter  -Take vitamin D 5000 units daily, this is over-the-counter  -consider compound cream  -Consider other medications of gabapentin, Lyrica, nortriptyline, Cymbalta, Topamax   -vitamin B1 and D check ordered  -Consider EMG test in the future  -Try contrast baths, handout provided  -Consider trigger point injections, referral for epidural steroid injections  -Consider referral to surgical spine  -Physical therapy referral for desensitization therapy provided  -Consider TENS unit socks  -Read a book called Back Sense  -Keep a diary/log of your pain symptoms over the next month as discussed  -Follow-up in 6 to 8 weeks

## 2024-09-12 ENCOUNTER — APPOINTMENT (OUTPATIENT)
Dept: INTEGRATIVE MEDICINE | Facility: CLINIC | Age: 69
End: 2024-09-12
Payer: COMMERCIAL

## 2024-09-18 ENCOUNTER — APPOINTMENT (OUTPATIENT)
Dept: INTEGRATIVE MEDICINE | Facility: CLINIC | Age: 69
End: 2024-09-18
Payer: COMMERCIAL

## 2024-09-30 ENCOUNTER — OFFICE VISIT (OUTPATIENT)
Dept: PAIN MEDICINE | Facility: CLINIC | Age: 69
End: 2024-09-30
Payer: COMMERCIAL

## 2024-09-30 VITALS
SYSTOLIC BLOOD PRESSURE: 120 MMHG | WEIGHT: 159 LBS | BODY MASS INDEX: 26.49 KG/M2 | RESPIRATION RATE: 16 BRPM | OXYGEN SATURATION: 98 % | HEIGHT: 65 IN | HEART RATE: 73 BPM | DIASTOLIC BLOOD PRESSURE: 90 MMHG

## 2024-09-30 DIAGNOSIS — G62.0 CHEMOTHERAPY-INDUCED PERIPHERAL NEUROPATHY (MULTI): Primary | ICD-10-CM

## 2024-09-30 DIAGNOSIS — G89.29 OTHER CHRONIC PAIN: ICD-10-CM

## 2024-09-30 DIAGNOSIS — T45.1X5A CHEMOTHERAPY-INDUCED PERIPHERAL NEUROPATHY (MULTI): Primary | ICD-10-CM

## 2024-09-30 PROCEDURE — 3008F BODY MASS INDEX DOCD: CPT | Performed by: ANESTHESIOLOGY

## 2024-09-30 PROCEDURE — 99204 OFFICE O/P NEW MOD 45 MIN: CPT | Performed by: ANESTHESIOLOGY

## 2024-09-30 PROCEDURE — 1125F AMNT PAIN NOTED PAIN PRSNT: CPT | Performed by: ANESTHESIOLOGY

## 2024-09-30 PROCEDURE — 1159F MED LIST DOCD IN RCRD: CPT | Performed by: ANESTHESIOLOGY

## 2024-09-30 PROCEDURE — 1160F RVW MEDS BY RX/DR IN RCRD: CPT | Performed by: ANESTHESIOLOGY

## 2024-09-30 PROCEDURE — 1036F TOBACCO NON-USER: CPT | Performed by: ANESTHESIOLOGY

## 2024-09-30 PROCEDURE — 99214 OFFICE O/P EST MOD 30 MIN: CPT | Performed by: ANESTHESIOLOGY

## 2024-09-30 ASSESSMENT — PAIN SCALES - GENERAL
PAINLEVEL_OUTOF10: 7
PAINLEVEL: 7

## 2024-09-30 ASSESSMENT — LIFESTYLE VARIABLES: TOTAL SCORE: 0

## 2024-09-30 ASSESSMENT — ENCOUNTER SYMPTOMS
RESPIRATORY NEGATIVE: 1
GASTROINTESTINAL NEGATIVE: 1
CONSTITUTIONAL NEGATIVE: 1
CARDIOVASCULAR NEGATIVE: 1
EYES NEGATIVE: 1
HEMATOLOGIC/LYMPHATIC NEGATIVE: 1
ENDOCRINE NEGATIVE: 1
NUMBNESS: 1
ALLERGIC/IMMUNOLOGIC NEGATIVE: 1
PSYCHIATRIC NEGATIVE: 1
MUSCULOSKELETAL NEGATIVE: 1

## 2024-09-30 ASSESSMENT — PATIENT HEALTH QUESTIONNAIRE - PHQ9
2. FEELING DOWN, DEPRESSED OR HOPELESS: NOT AT ALL
1. LITTLE INTEREST OR PLEASURE IN DOING THINGS: NOT AT ALL
SUM OF ALL RESPONSES TO PHQ9 QUESTIONS 1 AND 2: 0

## 2024-09-30 ASSESSMENT — PAIN - FUNCTIONAL ASSESSMENT: PAIN_FUNCTIONAL_ASSESSMENT: 0-10

## 2024-09-30 NOTE — PROGRESS NOTES
Subjective   Patient ID: Whitley Cohn is a 69 y.o. female who presents for Peripheral Neuropathy.  Neuropathy    Patient here today for a new patient evaluation of her peripheral neuropathy.  She was treated for cancer in 2006 and had chemotherapy for non-Hodgkin's lymphoma.  Then 1 year ago she had a cast on her left foot after a plantar fascia procedure.  She has a hot burning feeling in the heel and sore.  The pain varies for her from day to day.  Her pain gets worse during the day.  She maintains a very high level of activity.  She states that she will have a good day and then wake up early in the mroning and she has tightness, pressure, and burning pain in the feet.      She has been on gabapentin and lyrica in the past and has severe side effects of them.  She has never been on cymbalta in the past.  She does not take any regular medications at this time.  She does a lot of supplements.  She has tried acupuncture in the past.      Review of Systems   Constitutional: Negative.    HENT: Negative.     Eyes: Negative.    Respiratory: Negative.     Cardiovascular: Negative.    Gastrointestinal: Negative.    Endocrine: Negative.    Genitourinary: Negative.    Musculoskeletal: Negative.    Skin: Negative.    Allergic/Immunologic: Negative.    Neurological:  Positive for numbness.   Hematological: Negative.    Psychiatric/Behavioral: Negative.         Objective   Physical Exam  Vitals and nursing note reviewed.   Constitutional:       Appearance: Normal appearance.   HENT:      Head: Normocephalic and atraumatic.      Right Ear: Ear canal and external ear normal.      Left Ear: Ear canal and external ear normal.      Nose: Nose normal.      Mouth/Throat:      Mouth: Mucous membranes are moist.      Pharynx: Oropharynx is clear.   Eyes:      Conjunctiva/sclera: Conjunctivae normal.      Pupils: Pupils are equal, round, and reactive to light.   Cardiovascular:      Rate and Rhythm: Normal rate.   Pulmonary:       Effort: Pulmonary effort is normal. No respiratory distress.   Musculoskeletal:      Cervical back: Normal range of motion and neck supple.      Lumbar back: Tenderness present. Normal range of motion.   Skin:     General: Skin is warm and dry.   Neurological:      Mental Status: She is alert and oriented to person, place, and time.      Sensory: Sensory deficit (stocking glove loss of sensation in feet) present.      Motor: Motor function is intact.      Coordination: Coordination is intact.      Gait: Gait is intact.   Psychiatric:         Mood and Affect: Mood normal.         Thought Content: Thought content normal.       Assessment/Plan   Problem List Items Addressed This Visit    None  Visit Diagnoses         Codes    Chemotherapy-induced peripheral neuropathy (Multi)    -  Primary G62.0, T45.1X5A    Other chronic pain     G89.29             I nice discussion with the patient today our plan will be as follows.    Radiology: All available imaging was reviewed today.    Physically: Patient should continue with exercise regimen and her local gym as well as PT for balance.    Psychologically: Nothing at this time.    Medication: No recommendations for any medication changes.    Duration: Greater than 2 years.    Intervention: Patient is an excellent candidate for scrambler therapy treatment of her lower extremity neuropathy.  Risks, benefit, and alternatives of the procedure were discussed with the patient.  Oswestry score has been compelted and recorded.    Dermatome mapping  Lateral lower extremity L5 and S1      Hilario Vega MD 09/30/24 2:05 PM

## 2024-10-21 ENCOUNTER — APPOINTMENT (OUTPATIENT)
Dept: PHYSICAL MEDICINE AND REHAB | Facility: CLINIC | Age: 69
End: 2024-10-21
Payer: COMMERCIAL

## 2024-10-28 ENCOUNTER — APPOINTMENT (OUTPATIENT)
Dept: INTEGRATIVE MEDICINE | Facility: CLINIC | Age: 69
End: 2024-10-28
Payer: COMMERCIAL

## 2024-10-30 ENCOUNTER — APPOINTMENT (OUTPATIENT)
Dept: INTEGRATIVE MEDICINE | Facility: CLINIC | Age: 69
End: 2024-10-30
Payer: COMMERCIAL

## 2024-11-04 ENCOUNTER — EVALUATION (OUTPATIENT)
Dept: PHYSICAL THERAPY | Facility: CLINIC | Age: 69
End: 2024-11-04
Payer: COMMERCIAL

## 2024-11-04 DIAGNOSIS — M54.16 LUMBAR RADICULOPATHY: Primary | ICD-10-CM

## 2024-11-04 DIAGNOSIS — M79.2 NEUROPATHIC PAIN: ICD-10-CM

## 2024-11-04 PROCEDURE — 97161 PT EVAL LOW COMPLEX 20 MIN: CPT | Mod: GP

## 2024-11-04 PROCEDURE — 97110 THERAPEUTIC EXERCISES: CPT | Mod: GP

## 2024-11-04 NOTE — LETTER
November 5, 2024    Bianca Edge, PT  [address] [facility] [address]  [facility] [address]    Patient: Whitley Cohn   YOB: 1955   Date of Visit: 11/4/2024       Dear Barbie Nelson MD  [address] [facility] [address]  [place] [address]    The attached plan of care is being sent to you because your patient’s medical reimbursement requires that you certify the plan of care. Your signature is required to allow uninterrupted insurance coverage.      You may indicate your approval by signing below and faxing this form back to us at Dept Fax: [phone].    Please call Dept: [phone] with any questions or concerns.    Thank you for this referral,        Bianca Edge, PT  [facility] [address]  [facility]  [address]  [facility] [address]    Payer: Payor: [facility] / Plan: [facility] / Product Type: *No Product type* /                                                                         Date:     Dear Bianca Edge PT,     Re: Ms. Whitley Cohn, MRN:[id number]    I certify that I have reviewed the attached plan of care and it is medically necessary for Ms. Whitley Cohn (1955) who is under my care.          ______________________________________                    _________________  Provider name and credentials                                           Date and time                                                                                           Plan of Care 11/4/24   Effective from: 11/4/2024  Effective to: 2/2/2025    Plan ID: [id number]            Participants as of Finalize on 11/5/2024    Name Type Comments Contact Info    Barbie Nelson MD Referring Provider  [phone]    Bianca Edge PT Physical Therapist  [phone]       Last Plan Note     Author: Bianca Edge PT Status: Sign when Signing Visit Last edited: 11/4/2024  9:15 AM       Physical Therapy Evaluation    Patient Name:  Whitley Cohn  MRN: [id number]  Today's Date: 11/5/2024  Time Calculation  Start Time: 0915  Stop Time: 1006  Time Calculation (min): 51 min  PT Evaluation Time Entry  PT Evaluation (Low) Time Entry: 25  PT Therapeutic Procedures Time Entry  Therapeutic Exercise Time Entry: 25        Insurance: [facility]   Plan of Care: 11/4/24 to 2/2/25  Visit #1    Referring MD Yvette Nelson  Imaging Performed MRI shows levocurvature centered at L3, degenerative changes in lumbar spine, atrophy of parapsinals    Assessment     Whitley Cohn is a 69 y.o. referred for low back pain and BLE radicular sx. Patient demonstrates decreased lumbar ROM, decreased B hip strength, decreased core strength and awareness, altered gait mechanics, and pain. At this time, patient is limited with exercising, sleeping, and generally is bothered by LE symptoms. Patient will benefit from physical therapy services to address stated impairments and assist in management of chronic symptoms.    Plan  Treatment/Interventions: Cryotherapy, Education/ Instruction, Gait training, Hot pack, Manual therapy, Neuromuscular re-education, Self care/ home management, Therapeutic activities, Therapeutic exercises  PT Plan: Skilled PT  PT Frequency: 1 time per week  Duration: 10 weeks  Onset Date: 10/05/23  Certification Period Start Date: 11/04/24  Certification Period End Date: 02/02/25  Number of Treatments Authorized: MN  Rehab Potential: Fair  Plan of Care Agreement: Patient    Primary diagnosis: Lumbar radiculopathy  Current Problem  1. Lumbar radiculopathy  Follow Up In Physical Therapy      2. Neuropathic pain  Referral to Physical Therapy    Follow Up In Physical Therapy          General:  General  Reason for Referral: LBP and B radicular sx  Referred By: yvette nelson  Past Medical History Relevant to Rehab: PT at  for this issue  Preferred Learning Style: verbal, visual, written  Precautions:  Precautions  STEADI Fall Risk Score (The score of 4 or more indicates an  increased risk of falling): 1  Precautions Comment: Hx of cancer  Vital Signs:       Subjective:  Chief complaints: Low back and LE symptoms  Onset/Surgery Date: 10/5/2023  Mechanism of Injury: being in a boot for plantar fasciitis   Previous History: Yes did have similar issues   Personal Factors that may impact care: Hx of cancer     Pain:  Current: 4/10 Best: 1/10    Location: low back pain, feet/shin symptoms    Type: pressure/tightness   Aggravators: sitting too much, standing still, stress   Alleviators: PT,    Numbness/tingling? Yes    Function:   Work/Recreation: teacher, part time; PPT, [employer], hamstring stretch, clam, sidelying hip abd, yoga class 2x/week, walking outside, fitness center for weight machines and cardio (wants to be able to do the elliptical, back extension and twisting and ab machine; some free weight exercises)    Prior Level: Full   Current limitations: exercising without restriction, sleeping   Condition: Improving     Home Situation: house   Stairs: yes   Lives with    No concerns about home set up    Any falls in the past year No     Injuries? N/a    Fear of falling? No    Sleep:    Getting to sleep Yes   Disturbed Yes   Preferred position(s): sidelying to stomach     Goals for Therapy:    Be completely healed    Objective   Posture: elevated R PSIS/iliac crest  Palpation: Tender R SIJ, tender to PA on sacrum, L5/L4     ROM/Flexibility:    Lumbar  Flexion: 80      Extension: 25      Sidebend R / L: 20 / 20      Rotation R / L: 50% / 100%        Strength R / L:     Hip Flexion:     4+ / 4    Hip Extension:     4- / 4     Hip Abduction:    5 / 5    Hip Adduction:    5 / 5    Knee Extension:   4+ / 4+    Knee Flexion:       5 / 5    Ankle DF:             5 / 5    Ankle PF:              5 / 5     Neurological: Sensation is intact and symmetrical in BLEs.      Gait: Decreased hip extension during gait L > R       Special Tests:     Slump R / L : - / -     SLR R / L : - / -      Outcome  Measure:    CHARLA: 24 / 50 = 48 %      Treatment:  Access Code: [email or web address]  URL: [email or web address]  Date: 11/04/2024  Prepared by: Bianca Edge    Exercises  - Supine Inner Thigh Squeeze with Ball  - 2 x daily - 7 x weekly - 1 sets - 5 reps - 10 second hold  - Hooklying Isometric Hip Abduction with Belt  - 2 x daily - 7 x weekly - 1 sets - 5 reps - 10 second hold  - Bent Knee Fallouts  - 1 x daily - 7 x weekly - 1-2 sets - 10 reps  - Clamshell  - 1 x daily - 7 x weekly - 1 sets - 10-20 reps - 5 second hold    Goals:  Active       PT Problem       Patient will be independent with home exercise program for home maintenance.        Start:  11/05/24    Expected End:  12/05/24            Pt will demonstrate improved hip extension during gait to indicate improved lumbopelvic mobility.        Start:  11/05/24    Expected End:  12/05/24            Pt will report decreased SIJ tenderness to indicate improved mobility and LE strength for increased exercise participation.        Start:  11/05/24    Expected End:  01/04/25            Patient will decrease score on Modified CHARLA by 10 % to indicate decreased pain and increased functional level.         Start:  11/05/24    Expected End:  01/04/25                                    Current Participants as of 11/5/2024    Name Type Comments Contact Info    Barbie Nelson MD Referring Provider  [phone]    Signature pending    Bianca Edge, PT Physical Therapist  [phone]    Signature pending

## 2024-11-04 NOTE — PROGRESS NOTES
Physical Therapy Evaluation    Patient Name: Whitley Cohn  MRN: [id number]  Today's Date: 11/5/2024  Time Calculation  Start Time: 0915  Stop Time: 1006  Time Calculation (min): 51 min  PT Evaluation Time Entry  PT Evaluation (Low) Time Entry: 25  PT Therapeutic Procedures Time Entry  Therapeutic Exercise Time Entry: 25        Insurance: [facility]   Plan of Care: 11/4/24 to 2/2/25  Visit #1    Referring MD Yvette Nelson  Imaging Performed MRI shows levocurvature centered at L3, degenerative changes in lumbar spine, atrophy of parapsinals    Assessment     Whitley Cohn is a 69 y.o. referred for low back pain and BLE radicular sx. Patient demonstrates decreased lumbar ROM, decreased B hip strength, decreased core strength and awareness, altered gait mechanics, and pain. At this time, patient is limited with exercising, sleeping, and generally is bothered by LE symptoms. Patient will benefit from physical therapy services to address stated impairments and assist in management of chronic symptoms.    Plan  Treatment/Interventions: Cryotherapy, Education/ Instruction, Gait training, Hot pack, Manual therapy, Neuromuscular re-education, Self care/ home management, Therapeutic activities, Therapeutic exercises  PT Plan: Skilled PT  PT Frequency: 1 time per week  Duration: 10 weeks  Onset Date: 10/05/23  Certification Period Start Date: 11/04/24  Certification Period End Date: 02/02/25  Number of Treatments Authorized: MN  Rehab Potential: Fair  Plan of Care Agreement: Patient    Primary diagnosis: Lumbar radiculopathy  Current Problem  1. Lumbar radiculopathy  Follow Up In Physical Therapy      2. Neuropathic pain  Referral to Physical Therapy    Follow Up In Physical Therapy          General:  General  Reason for Referral: LBP and B radicular sx  Referred By: yvette nelson  Past Medical History Relevant to Rehab: PT at  for this issue  Preferred Learning Style: verbal, visual, written  Precautions:  Precautions  STEADI Fall Risk  Score (The score of 4 or more indicates an increased risk of falling): 1  Precautions Comment: Hx of cancer  Vital Signs:       Subjective:  Chief complaints: Low back and LE symptoms  Onset/Surgery Date: 10/5/2023  Mechanism of Injury: being in a boot for plantar fasciitis   Previous History: Yes did have similar issues   Personal Factors that may impact care: Hx of cancer     Pain:  Current: 4/10 Best: 1/10    Location: low back pain, feet/shin symptoms    Type: pressure/tightness   Aggravators: sitting too much, standing still, stress   Alleviators: PT,    Numbness/tingling? Yes    Function:   Work/Recreation: teacher, part time; PPT, [employer], hamstring stretch, clam, sidelying hip abd, yoga class 2x/week, walking outside, fitness center for weight machines and cardio (wants to be able to do the elliptical, back extension and twisting and ab machine; some free weight exercises)    Prior Level: Full   Current limitations: exercising without restriction, sleeping   Condition: Improving     Home Situation: house   Stairs: yes   Lives with    No concerns about home set up    Any falls in the past year No     Injuries? N/a    Fear of falling? No    Sleep:    Getting to sleep Yes   Disturbed Yes   Preferred position(s): sidelying to stomach     Goals for Therapy:    Be completely healed    Objective   Posture: elevated R PSIS/iliac crest  Palpation: Tender R SIJ, tender to PA on sacrum, L5/L4     ROM/Flexibility:    Lumbar  Flexion: 80      Extension: 25      Sidebend R / L: 20 / 20      Rotation R / L: 50% / 100%        Strength R / L:     Hip Flexion:     4+ / 4    Hip Extension:     4- / 4     Hip Abduction:    5 / 5    Hip Adduction:    5 / 5    Knee Extension:   4+ / 4+    Knee Flexion:       5 / 5    Ankle DF:             5 / 5    Ankle PF:              5 / 5     Neurological: Sensation is intact and symmetrical in BLEs.      Gait: Decreased hip extension during gait L > R       Special Tests:     Slump R / L  : - / -     SLR R / L : - / -      Outcome Measure:    CHARLA: 24 / 50 = 48 %      Treatment:  Access Code: [email or web address]  URL: [email or web address]  Date: 11/04/2024  Prepared by: Bianca Edge    Exercises  - Supine Inner Thigh Squeeze with Ball  - 2 x daily - 7 x weekly - 1 sets - 5 reps - 10 second hold  - Hooklying Isometric Hip Abduction with Belt  - 2 x daily - 7 x weekly - 1 sets - 5 reps - 10 second hold  - Bent Knee Fallouts  - 1 x daily - 7 x weekly - 1-2 sets - 10 reps  - Clamshell  - 1 x daily - 7 x weekly - 1 sets - 10-20 reps - 5 second hold    Goals:  Active       PT Problem       Patient will be independent with home exercise program for home maintenance.        Start:  11/05/24    Expected End:  12/05/24            Pt will demonstrate improved hip extension during gait to indicate improved lumbopelvic mobility.        Start:  11/05/24    Expected End:  12/05/24            Pt will report decreased SIJ tenderness to indicate improved mobility and LE strength for increased exercise participation.        Start:  11/05/24    Expected End:  01/04/25            Patient will decrease score on Modified CHARLA by 10 % to indicate decreased pain and increased functional level.         Start:  11/05/24    Expected End:  01/04/25

## 2024-11-05 ASSESSMENT — ENCOUNTER SYMPTOMS
DEPRESSION: 0
LOSS OF SENSATION IN FEET: 1
OCCASIONAL FEELINGS OF UNSTEADINESS: 0

## 2024-11-08 ENCOUNTER — ANCILLARY PROCEDURE (OUTPATIENT)
Dept: URGENT CARE | Age: 69
End: 2024-11-08
Payer: COMMERCIAL

## 2024-11-08 ENCOUNTER — OFFICE VISIT (OUTPATIENT)
Dept: URGENT CARE | Age: 69
End: 2024-11-08
Payer: COMMERCIAL

## 2024-11-08 ENCOUNTER — APPOINTMENT (OUTPATIENT)
Dept: URGENT CARE | Age: 69
End: 2024-11-08
Payer: COMMERCIAL

## 2024-11-08 VITALS
OXYGEN SATURATION: 95 % | BODY MASS INDEX: 25.83 KG/M2 | WEIGHT: 155 LBS | SYSTOLIC BLOOD PRESSURE: 140 MMHG | TEMPERATURE: 100 F | RESPIRATION RATE: 20 BRPM | HEART RATE: 82 BPM | HEIGHT: 65 IN | DIASTOLIC BLOOD PRESSURE: 82 MMHG

## 2024-11-08 DIAGNOSIS — J18.9 PNEUMONIA DUE TO INFECTIOUS ORGANISM, UNSPECIFIED LATERALITY, UNSPECIFIED PART OF LUNG: Primary | ICD-10-CM

## 2024-11-08 DIAGNOSIS — R05.1 ACUTE COUGH: ICD-10-CM

## 2024-11-08 LAB
POC RAPID INFLUENZA A: NEGATIVE
POC RAPID INFLUENZA B: NEGATIVE
POC SARS-COV-2 AG BINAX: NORMAL

## 2024-11-08 PROCEDURE — 71046 X-RAY EXAM CHEST 2 VIEWS: CPT | Performed by: STUDENT IN AN ORGANIZED HEALTH CARE EDUCATION/TRAINING PROGRAM

## 2024-11-08 RX ORDER — AMOXICILLIN AND CLAVULANATE POTASSIUM 875; 125 MG/1; MG/1
1 TABLET, FILM COATED ORAL 2 TIMES DAILY
Qty: 10 TABLET | Refills: 0 | Status: SHIPPED | OUTPATIENT
Start: 2024-11-08 | End: 2024-11-13

## 2024-11-08 RX ORDER — DOXYCYCLINE 100 MG/1
100 CAPSULE ORAL 2 TIMES DAILY
Qty: 10 CAPSULE | Refills: 0 | Status: SHIPPED | OUTPATIENT
Start: 2024-11-08 | End: 2024-11-13

## 2024-11-08 ASSESSMENT — ENCOUNTER SYMPTOMS
RHINORRHEA: 1
MYALGIAS: 1
COUGH: 1
SORE THROAT: 1

## 2024-11-08 NOTE — PATIENT INSTRUCTIONS
Please follow up with your primary provider within one week. You may schedule an appointment online at \[facility]\"".[email or web address]/doctors or call [phone]. Go to the Emergency Department if symptoms significantly worsen or if you develop symptoms including but not limited to chest pain or shortness of breath.

## 2024-11-08 NOTE — PROGRESS NOTES
Subjective   Patient ID: Whitley Cohn is a 69 y.o. female. They present today with a chief complaint of Cough (Cough runny nose sore throat SOB).    History of Present Illness  Pt presents with illness x 2-3 d. Sx include cough, congestion, runny nose, sore throat, body aches. Grandchildren sick with similar. She is concerned for pneumonia given exposure via grandkid. She did not try anything for sx at home. Non smoker. Pmhx includes remote non hodgkins lymphoma, in remission since 2007. Denies fever chills cp sob dizziness abd pain nvd difficulty swallowing.       Cough  Associated symptoms include myalgias, rhinorrhea and a sore throat.       Past Medical History  Allergies as of 11/08/2024 - Reviewed 11/08/2024   Allergen Reaction Noted    Pneumococcal vaccine Fever 05/11/2022    Albuterol Palpitations 02/28/2023    Azithromycin Other 02/28/2023    Ciprofloxacin Unknown 10/02/2023    Levofloxacin Unknown 10/02/2023    Nsaids (non-steroidal anti-inflammatory drug) GI Upset and Unknown 10/02/2023       (Not in a hospital admission)       Past Medical History:   Diagnosis Date    Asymptomatic menopausal state     Menopause    Unspecified tear of unspecified meniscus, current injury, left knee, initial encounter 09/13/2017    Tear of meniscus of left knee       No past surgical history on file.     reports that she has never smoked. She has never used smokeless tobacco. She reports that she does not drink alcohol and does not use drugs.    Review of Systems  Review of Systems   HENT:  Positive for congestion, rhinorrhea and sore throat.    Respiratory:  Positive for cough.    Musculoskeletal:  Positive for myalgias.   All other systems reviewed and are negative.                                 Objective    Vitals:    11/08/24 1812   BP: 140/82   BP Location: Left arm   Patient Position: Sitting   BP Cuff Size: Small adult   Pulse: 82   Resp: 20   Temp: 37.8 °C (100 °F)   TempSrc: Oral   SpO2: 95%   Weight: 70.3 kg  "(155 lb)   Height: 1.651 m (5' 5\")     No LMP recorded.    Physical Exam  Vitals and nursing note reviewed.   Constitutional:       General: She is not in acute distress.     Appearance: Normal appearance. She is not ill-appearing, toxic-appearing or diaphoretic.   HENT:      Head: Normocephalic and atraumatic.      Right Ear: Tympanic membrane, ear canal and external ear normal.      Left Ear: Tympanic membrane, ear canal and external ear normal.      Nose: Nose normal.      Right Turbinates: Not enlarged or swollen.      Left Turbinates: Not enlarged or swollen.      Right Sinus: No maxillary sinus tenderness or frontal sinus tenderness.      Left Sinus: No maxillary sinus tenderness or frontal sinus tenderness.   Cardiovascular:      Rate and Rhythm: Normal rate and regular rhythm.      Pulses: Normal pulses.      Heart sounds: No murmur heard.  Pulmonary:      Effort: Pulmonary effort is normal. No respiratory distress.      Breath sounds: Normal breath sounds. No wheezing or rales.   Neurological:      Mental Status: She is alert.         Procedures    Point of Care Test & Imaging Results from this visit  No results found for this visit on 11/08/24.   No results found.    Diagnostic study results (if any) were reviewed by Chelsey Fontanez PA-C.    Assessment/Plan   Allergies, medications, history, and pertinent labs/EKGs/Imaging reviewed by Chelsey Fontanez PA-C.     Medical Decision Making  Wet read of cxr shows patchiness in b/l lower lobes concerning for pneumonia. Covid, flu negative. Advised supportive care. Follow up with primary care in 1 week. Strict ED precautions discussed.     Orders and Diagnoses  Diagnoses and all orders for this visit:  Acute cough  -     POCT Covid-19 Rapid Antigen  -     POCT Influenza A/B manually resulted  -     XR chest 2 views; Future      Medical Admin Record      Patient disposition: Home    Electronically signed by Chelsey Fontanez PA-C  6:31 PM      "

## 2024-11-10 ENCOUNTER — PATIENT MESSAGE (OUTPATIENT)
Dept: PAIN MEDICINE | Facility: CLINIC | Age: 69
End: 2024-11-10
Payer: COMMERCIAL

## 2024-11-13 ENCOUNTER — APPOINTMENT (OUTPATIENT)
Dept: PHYSICAL THERAPY | Facility: CLINIC | Age: 69
End: 2024-11-13
Payer: COMMERCIAL

## 2024-11-14 ENCOUNTER — OFFICE VISIT (OUTPATIENT)
Dept: PAIN MEDICINE | Facility: CLINIC | Age: 69
End: 2024-11-14
Payer: COMMERCIAL

## 2024-11-14 VITALS
HEIGHT: 65 IN | RESPIRATION RATE: 16 BRPM | BODY MASS INDEX: 25.83 KG/M2 | DIASTOLIC BLOOD PRESSURE: 80 MMHG | SYSTOLIC BLOOD PRESSURE: 122 MMHG | WEIGHT: 155 LBS

## 2024-11-14 DIAGNOSIS — T45.1X5A CHEMOTHERAPY-INDUCED PERIPHERAL NEUROPATHY (MULTI): Primary | ICD-10-CM

## 2024-11-14 DIAGNOSIS — G62.0 CHEMOTHERAPY-INDUCED PERIPHERAL NEUROPATHY (MULTI): Primary | ICD-10-CM

## 2024-11-14 PROCEDURE — 99213 OFFICE O/P EST LOW 20 MIN: CPT | Performed by: ANESTHESIOLOGY

## 2024-11-14 PROCEDURE — 3008F BODY MASS INDEX DOCD: CPT | Performed by: ANESTHESIOLOGY

## 2024-11-14 PROCEDURE — 1125F AMNT PAIN NOTED PAIN PRSNT: CPT | Performed by: ANESTHESIOLOGY

## 2024-11-14 PROCEDURE — 1160F RVW MEDS BY RX/DR IN RCRD: CPT | Performed by: ANESTHESIOLOGY

## 2024-11-14 PROCEDURE — 1036F TOBACCO NON-USER: CPT | Performed by: ANESTHESIOLOGY

## 2024-11-14 PROCEDURE — 1159F MED LIST DOCD IN RCRD: CPT | Performed by: ANESTHESIOLOGY

## 2024-11-14 RX ORDER — ALCOHOL 2.38 KG/3.79L
1 GEL TOPICAL 3 TIMES DAILY
Qty: 90 CAPSULE | Refills: 3 | Status: SHIPPED | OUTPATIENT
Start: 2024-11-14 | End: 2024-12-14

## 2024-11-14 ASSESSMENT — ENCOUNTER SYMPTOMS
CARDIOVASCULAR NEGATIVE: 1
ALLERGIC/IMMUNOLOGIC NEGATIVE: 1
GASTROINTESTINAL NEGATIVE: 1
CONSTITUTIONAL NEGATIVE: 1
PSYCHIATRIC NEGATIVE: 1
HEMATOLOGIC/LYMPHATIC NEGATIVE: 1
EYES NEGATIVE: 1
LEG PAIN: 1
MUSCULOSKELETAL NEGATIVE: 1
NUMBNESS: 1
ENDOCRINE NEGATIVE: 1
RESPIRATORY NEGATIVE: 1

## 2024-11-14 ASSESSMENT — PATIENT HEALTH QUESTIONNAIRE - PHQ9
SUM OF ALL RESPONSES TO PHQ9 QUESTIONS 1 AND 2: 0
1. LITTLE INTEREST OR PLEASURE IN DOING THINGS: NOT AT ALL
2. FEELING DOWN, DEPRESSED OR HOPELESS: NOT AT ALL

## 2024-11-14 ASSESSMENT — PAIN SCALES - GENERAL
PAINLEVEL_OUTOF10: 4
PAINLEVEL_OUTOF10: 4

## 2024-11-14 ASSESSMENT — PAIN DESCRIPTION - DESCRIPTORS: DESCRIPTORS: BURNING

## 2024-11-14 ASSESSMENT — PAIN - FUNCTIONAL ASSESSMENT: PAIN_FUNCTIONAL_ASSESSMENT: 0-10

## 2024-11-14 NOTE — PROGRESS NOTES
Subjective   Patient ID: Whitley Cohn is a 69 y.o. female who presents for Foot Pain and Leg Pain.  Leg Pain   Associated symptoms include numbness.   Patient here today to discuss her CIPN and her up coming scrmabler treatment.  She wanted to know if there anything additional treatment she could do int eh mean time.  She reports her pain as a 4/10.      Review of Systems   Constitutional: Negative.    HENT: Negative.     Eyes: Negative.    Respiratory: Negative.     Cardiovascular: Negative.    Gastrointestinal: Negative.    Endocrine: Negative.    Genitourinary: Negative.    Musculoskeletal: Negative.    Skin: Negative.    Allergic/Immunologic: Negative.    Neurological:  Positive for numbness.   Hematological: Negative.    Psychiatric/Behavioral: Negative.         Objective   Physical Exam  Patient here today for follow up today for her chemotherapy induced neuropathy.  She reports her pain as a 4/10.  She is scheduled for for Scrambler Therapy in January.  She wanted to discuss if there are any other options.    Assessment/Plan   Problem List Items Addressed This Visit    None  Visit Diagnoses         Codes    Chemotherapy-induced peripheral neuropathy (Multi)    -  Primary G62.0, T45.1X5A              I nice discussion with the patient today our plan will be as follows.    Radiology: All imaging was reviewed today.     Physically:  Patient scheduled for Scrambler therapy in Jan.     Psychologically:  No BH issues associated with the patient pain.     Medication: Patient to trial Metanx for her neuropathy.      Duration:  > 1 year    Intervention:  Patient to follow up for scrambler appt in Jan.          Please note that this report has been produced using speech recognition software. It may contain errors related to grammar, punctuation or spelling. Electronically signed, but not reviewed.          Hilario Vega MD 11/14/24 2:29 PM

## 2024-11-18 ENCOUNTER — OFFICE VISIT (OUTPATIENT)
Dept: PAIN MEDICINE | Facility: CLINIC | Age: 69
End: 2024-11-18
Payer: COMMERCIAL

## 2024-11-18 VITALS
DIASTOLIC BLOOD PRESSURE: 82 MMHG | HEIGHT: 65 IN | SYSTOLIC BLOOD PRESSURE: 140 MMHG | WEIGHT: 155 LBS | RESPIRATION RATE: 18 BRPM | BODY MASS INDEX: 25.83 KG/M2 | OXYGEN SATURATION: 95 % | HEART RATE: 73 BPM

## 2024-11-18 DIAGNOSIS — G62.0 CHEMOTHERAPY-INDUCED NEUROPATHY (MULTI): Primary | ICD-10-CM

## 2024-11-18 DIAGNOSIS — T45.1X5A CHEMOTHERAPY-INDUCED NEUROPATHY (MULTI): Primary | ICD-10-CM

## 2024-11-18 PROCEDURE — 1125F AMNT PAIN NOTED PAIN PRSNT: CPT | Performed by: PHYSICIAN ASSISTANT

## 2024-11-18 PROCEDURE — 1160F RVW MEDS BY RX/DR IN RCRD: CPT | Performed by: PHYSICIAN ASSISTANT

## 2024-11-18 PROCEDURE — 3008F BODY MASS INDEX DOCD: CPT | Performed by: PHYSICIAN ASSISTANT

## 2024-11-18 PROCEDURE — 99215 OFFICE O/P EST HI 40 MIN: CPT | Performed by: PHYSICIAN ASSISTANT

## 2024-11-18 PROCEDURE — 1036F TOBACCO NON-USER: CPT | Performed by: PHYSICIAN ASSISTANT

## 2024-11-18 PROCEDURE — 1159F MED LIST DOCD IN RCRD: CPT | Performed by: PHYSICIAN ASSISTANT

## 2024-11-18 ASSESSMENT — PAIN - FUNCTIONAL ASSESSMENT: PAIN_FUNCTIONAL_ASSESSMENT: 0-10

## 2024-11-18 ASSESSMENT — LIFESTYLE VARIABLES
HOW OFTEN DO YOU HAVE A DRINK CONTAINING ALCOHOL: NEVER
HOW MANY STANDARD DRINKS CONTAINING ALCOHOL DO YOU HAVE ON A TYPICAL DAY: PATIENT DOES NOT DRINK
HOW OFTEN DO YOU HAVE SIX OR MORE DRINKS ON ONE OCCASION: NEVER
SKIP TO QUESTIONS 9-10: 1
AUDIT-C TOTAL SCORE: 0

## 2024-11-18 ASSESSMENT — ENCOUNTER SYMPTOMS
EYES NEGATIVE: 1
ALLERGIC/IMMUNOLOGIC NEGATIVE: 1
CONSTITUTIONAL NEGATIVE: 1
PAIN: 1
HEMATOLOGIC/LYMPHATIC NEGATIVE: 1
NUMBNESS: 1
CARDIOVASCULAR NEGATIVE: 1
PSYCHIATRIC NEGATIVE: 1
ENDOCRINE NEGATIVE: 1
MUSCULOSKELETAL NEGATIVE: 1
RESPIRATORY NEGATIVE: 1
GASTROINTESTINAL NEGATIVE: 1

## 2024-11-18 ASSESSMENT — PATIENT HEALTH QUESTIONNAIRE - PHQ9
SUM OF ALL RESPONSES TO PHQ9 QUESTIONS 1 & 2: 0
2. FEELING DOWN, DEPRESSED OR HOPELESS: NOT AT ALL
1. LITTLE INTEREST OR PLEASURE IN DOING THINGS: NOT AT ALL

## 2024-11-18 ASSESSMENT — PAIN SCALES - GENERAL
PAINLEVEL_OUTOF10: 7
PAINLEVEL_OUTOF10: 7

## 2024-11-18 NOTE — PROGRESS NOTES
Subjective   Patient ID: Whitley Cohn is a 69 y.o. female who presents for Pain (Scrambler therapy).  Patient is a 68-year-old female with chemotherapy-induced peripheral neuropathy patient has been in remission from her non-Hodgkin's lymphoma.  Patient has not utilize any Lyrica or gabapentin's in greater than 30 days.    Patient reports her symptoms of neuropathic pain tightness burning and neuropathic symptoms in her lower extremities. She reports symptoms as a 7/10    Pain      Review of Systems   Constitutional: Negative.    HENT: Negative.     Eyes: Negative.    Respiratory: Negative.     Cardiovascular: Negative.    Gastrointestinal: Negative.    Endocrine: Negative.    Genitourinary: Negative.    Musculoskeletal: Negative.    Skin: Negative.    Allergic/Immunologic: Negative.    Neurological:  Positive for numbness.   Hematological: Negative.    Psychiatric/Behavioral: Negative.         Objective   Physical Exam  Musculoskeletal:        Legs:        Assessment/Plan   Problem List Items Addressed This Visit             ICD-10-CM    Chemotherapy-induced neuropathy (Multi) - Primary G62.0, T45.1X5A   TREATMENT PLAN:  Day [ 1 ] of Scrambler Therapy.   Verbal consent obtained.  Session initiated by myself with proper electrode/lead placement to applicable dermatomes.   Patient tolerated treatment well .  Encouraged patient to keep a diary or log of symptoms to monitor for any changes in pain, sensation, etc.    Recommend follow-up as needed.       PROCEDURE NOTE:  Treatment [ 1 ] with Scrambler therapy was initiated by myself.   Verbal consent obtained from patient.  Therapy start time: [ 249  ]   Leads were applied to: [ bilateral L4 and S1 ]  therapeutic dose :  [ started at 1200 was reduced to 1000 30 mins Into therapy.    ]    Patient tolerated treatment [ reduced stim 1/2 through session  ] .  Pain reported at start of therapy session was about a [ 7 /10 ].  Pain reported at end of therapy session;  [  3/10  ] .  Leads were removed and patient left the office without any difficulty.  60 min total scrambler treatment time.           Lewis Pozo PA-C 11/18/24 3:03 PM

## 2024-11-19 ENCOUNTER — OFFICE VISIT (OUTPATIENT)
Dept: PAIN MEDICINE | Facility: CLINIC | Age: 69
End: 2024-11-19
Payer: COMMERCIAL

## 2024-11-19 VITALS
OXYGEN SATURATION: 96 % | WEIGHT: 155 LBS | RESPIRATION RATE: 18 BRPM | HEIGHT: 65 IN | SYSTOLIC BLOOD PRESSURE: 130 MMHG | HEART RATE: 79 BPM | DIASTOLIC BLOOD PRESSURE: 80 MMHG | BODY MASS INDEX: 25.83 KG/M2

## 2024-11-19 DIAGNOSIS — T45.1X5A CHEMOTHERAPY-INDUCED NEUROPATHY (MULTI): Primary | ICD-10-CM

## 2024-11-19 DIAGNOSIS — G62.0 CHEMOTHERAPY-INDUCED NEUROPATHY (MULTI): Primary | ICD-10-CM

## 2024-11-19 PROCEDURE — 1160F RVW MEDS BY RX/DR IN RCRD: CPT | Performed by: PHYSICIAN ASSISTANT

## 2024-11-19 PROCEDURE — 99215 OFFICE O/P EST HI 40 MIN: CPT | Performed by: PHYSICIAN ASSISTANT

## 2024-11-19 PROCEDURE — 1159F MED LIST DOCD IN RCRD: CPT | Performed by: PHYSICIAN ASSISTANT

## 2024-11-19 PROCEDURE — 3008F BODY MASS INDEX DOCD: CPT | Performed by: PHYSICIAN ASSISTANT

## 2024-11-19 PROCEDURE — 1036F TOBACCO NON-USER: CPT | Performed by: PHYSICIAN ASSISTANT

## 2024-11-19 ASSESSMENT — ENCOUNTER SYMPTOMS
ALLERGIC/IMMUNOLOGIC NEGATIVE: 1
MUSCULOSKELETAL NEGATIVE: 1
NUMBNESS: 1
PSYCHIATRIC NEGATIVE: 1
CARDIOVASCULAR NEGATIVE: 1
PAIN: 1
EYES NEGATIVE: 1
GASTROINTESTINAL NEGATIVE: 1
HEMATOLOGIC/LYMPHATIC NEGATIVE: 1
RESPIRATORY NEGATIVE: 1
CONSTITUTIONAL NEGATIVE: 1
ENDOCRINE NEGATIVE: 1

## 2024-11-19 ASSESSMENT — LIFESTYLE VARIABLES
HOW OFTEN DO YOU HAVE A DRINK CONTAINING ALCOHOL: NEVER
HOW MANY STANDARD DRINKS CONTAINING ALCOHOL DO YOU HAVE ON A TYPICAL DAY: PATIENT DOES NOT DRINK
HOW OFTEN DO YOU HAVE SIX OR MORE DRINKS ON ONE OCCASION: NEVER
AUDIT-C TOTAL SCORE: 0
SKIP TO QUESTIONS 9-10: 1

## 2024-11-19 ASSESSMENT — PAIN - FUNCTIONAL ASSESSMENT: PAIN_FUNCTIONAL_ASSESSMENT: 0-10

## 2024-11-19 ASSESSMENT — PAIN SCALES - GENERAL
PAINLEVEL_OUTOF10: 5
PAINLEVEL_OUTOF10: 5 - MODERATE PAIN

## 2024-11-19 ASSESSMENT — PATIENT HEALTH QUESTIONNAIRE - PHQ9
2. FEELING DOWN, DEPRESSED OR HOPELESS: NOT AT ALL
1. LITTLE INTEREST OR PLEASURE IN DOING THINGS: NOT AT ALL
SUM OF ALL RESPONSES TO PHQ9 QUESTIONS 1 & 2: 0

## 2024-11-19 ASSESSMENT — PAIN DESCRIPTION - DESCRIPTORS: DESCRIPTORS: TIGHTNESS

## 2024-11-19 NOTE — PROGRESS NOTES
Subjective   Patient ID: Whitley Cohn is a 69 y.o. female who presents for No chief complaint on file..  Patient is a 68-year-old female with chemotherapy-induced peripheral neuropathy patient has been in remission from her non-Hodgkin's lymphoma.  Patient has not utilize any Lyrica or gabapentin's in greater than 30 days.    Patient reports her symptoms of neuropathic pain tightness burning and neuropathic symptoms in her lower extremities. She reports symptoms as a 5/10. Reports less tightness in feet, variable below the knee tingling.     Pain        Review of Systems   Constitutional: Negative.    HENT: Negative.     Eyes: Negative.    Respiratory: Negative.     Cardiovascular: Negative.    Gastrointestinal: Negative.    Endocrine: Negative.    Genitourinary: Negative.    Musculoskeletal: Negative.    Skin: Negative.    Allergic/Immunologic: Negative.    Neurological:  Positive for numbness.   Hematological: Negative.    Psychiatric/Behavioral: Negative.         Objective   Physical Exam  Musculoskeletal:        Legs:          Assessment/Plan   Problem List Items Addressed This Visit             ICD-10-CM    Chemotherapy-induced neuropathy (Multi) - Primary G62.0, T45.1X5A     TREATMENT PLAN:  Day [ 2 ] of Scrambler Therapy.   Verbal consent obtained.  Session initiated by myself with proper electrode/lead placement to applicable dermatomes.   Patient tolerated treatment well .  Encouraged patient to keep a diary or log of symptoms to monitor for any changes in pain, sensation, etc.    Recommend follow-up as needed.       PROCEDURE NOTE:  Treatment [ 2 ] with Scrambler therapy was initiated by myself.   Verbal consent obtained from patient.  Therapy start time: [ 138 pm  ]   Leads were applied to: [ bilateral L4 and S1 ]  therapeutic dose :  [1100 BLE.    ]     Patient tolerated treatment [ well  ] .  Pain reported at start of therapy session was about a [ 5 /10 ].  Pain reported at end of therapy session;  [   3/10 ] .  Leads were removed and patient left the office without any difficulty.  60 min total scrambler treatment time       Lewis Pozo PA-C 11/19/24 2:46 PM

## 2024-11-20 ENCOUNTER — OFFICE VISIT (OUTPATIENT)
Dept: PAIN MEDICINE | Facility: CLINIC | Age: 69
End: 2024-11-20
Payer: COMMERCIAL

## 2024-11-20 VITALS
HEART RATE: 63 BPM | DIASTOLIC BLOOD PRESSURE: 74 MMHG | HEIGHT: 65 IN | BODY MASS INDEX: 25.83 KG/M2 | RESPIRATION RATE: 18 BRPM | SYSTOLIC BLOOD PRESSURE: 124 MMHG | OXYGEN SATURATION: 96 % | WEIGHT: 155 LBS

## 2024-11-20 DIAGNOSIS — T45.1X5A CHEMOTHERAPY-INDUCED NEUROPATHY (MULTI): Primary | ICD-10-CM

## 2024-11-20 DIAGNOSIS — G62.0 CHEMOTHERAPY-INDUCED NEUROPATHY (MULTI): Primary | ICD-10-CM

## 2024-11-20 PROCEDURE — 1125F AMNT PAIN NOTED PAIN PRSNT: CPT | Performed by: PHYSICIAN ASSISTANT

## 2024-11-20 PROCEDURE — 1160F RVW MEDS BY RX/DR IN RCRD: CPT | Performed by: PHYSICIAN ASSISTANT

## 2024-11-20 PROCEDURE — 99215 OFFICE O/P EST HI 40 MIN: CPT | Performed by: PHYSICIAN ASSISTANT

## 2024-11-20 PROCEDURE — 3008F BODY MASS INDEX DOCD: CPT | Performed by: PHYSICIAN ASSISTANT

## 2024-11-20 PROCEDURE — 1159F MED LIST DOCD IN RCRD: CPT | Performed by: PHYSICIAN ASSISTANT

## 2024-11-20 ASSESSMENT — ENCOUNTER SYMPTOMS
MUSCULOSKELETAL NEGATIVE: 1
PAIN: 1
HEMATOLOGIC/LYMPHATIC NEGATIVE: 1
GASTROINTESTINAL NEGATIVE: 1
PSYCHIATRIC NEGATIVE: 1
RESPIRATORY NEGATIVE: 1
ALLERGIC/IMMUNOLOGIC NEGATIVE: 1
NUMBNESS: 1
CARDIOVASCULAR NEGATIVE: 1
ENDOCRINE NEGATIVE: 1
EYES NEGATIVE: 1
CONSTITUTIONAL NEGATIVE: 1

## 2024-11-20 ASSESSMENT — PAIN SCALES - GENERAL
PAINLEVEL_OUTOF10: 8
PAINLEVEL_OUTOF10: 8

## 2024-11-20 ASSESSMENT — PAIN - FUNCTIONAL ASSESSMENT: PAIN_FUNCTIONAL_ASSESSMENT: 0-10

## 2024-11-20 ASSESSMENT — LIFESTYLE VARIABLES
SKIP TO QUESTIONS 9-10: 1
AUDIT-C TOTAL SCORE: 0
HOW OFTEN DO YOU HAVE A DRINK CONTAINING ALCOHOL: NEVER
HOW OFTEN DO YOU HAVE SIX OR MORE DRINKS ON ONE OCCASION: NEVER
HOW MANY STANDARD DRINKS CONTAINING ALCOHOL DO YOU HAVE ON A TYPICAL DAY: PATIENT DOES NOT DRINK

## 2024-11-20 NOTE — PROGRESS NOTES
Subjective   Patient ID: Whitley Cohn is a 69 y.o. female who presents for Pain (Scrambler therapy ).  Patient is a 68-year-old female with chemotherapy-induced peripheral neuropathy patient has been in remission from her non-Hodgkin's lymphoma.  Patient has not utilize any Lyrica or gabapentin's in greater than 30 days.    Patient reports her symptoms of neuropathic pain tightness burning and neuropathic symptoms in her lower extremities. She reports symptoms as a 8/10.  Patient states that she was doing well until she got out to the car.  After walking around she started having increasing aching and neuropathic symptoms in the bilateral foot along the arch    Pain      Review of Systems   Constitutional: Negative.    HENT: Negative.     Eyes: Negative.    Respiratory: Negative.     Cardiovascular: Negative.    Gastrointestinal: Negative.    Endocrine: Negative.    Genitourinary: Negative.    Musculoskeletal: Negative.    Skin: Negative.    Allergic/Immunologic: Negative.    Neurological:  Positive for numbness.   Hematological: Negative.    Psychiatric/Behavioral: Negative.         Objective   Physical Exam  Musculoskeletal:        Legs:        Assessment/Plan   Problem List Items Addressed This Visit             ICD-10-CM    Chemotherapy-induced neuropathy (Multi) - Primary G62.0, T45.1X5A     TREATMENT PLAN:  Day [ 3 ] of Scrambler Therapy.   Verbal consent obtained.  Session initiated by myself with proper electrode/lead placement to applicable dermatomes.   Patient tolerated treatment well .  Encouraged patient to keep a diary or log of symptoms to monitor for any changes in pain, sensation, etc.    Recommend follow-up as needed.       PROCEDURE NOTE:  Treatment [ 3 ] with Scrambler therapy was initiated by myself.   Verbal consent obtained from patient.  Therapy start time: [ 209 pm  ]   Leads were applied to: [ bilateral L4 and S1 ]  therapeutic dose :  [1100 BLE.    ]     Patient tolerated treatment [ well   ] .  Pain reported at start of therapy session was about a [ 8 /10 ].  Pain reported at end of therapy session;  [  4/10 ] .  Leads were removed and patient left the office without any difficulty.  60 min total scrambler treatment time       Lewis Pozo PA-C 11/20/24 2:09 PM

## 2024-11-21 ENCOUNTER — OFFICE VISIT (OUTPATIENT)
Dept: PAIN MEDICINE | Facility: CLINIC | Age: 69
End: 2024-11-21
Payer: COMMERCIAL

## 2024-11-21 ENCOUNTER — APPOINTMENT (OUTPATIENT)
Dept: PHYSICAL THERAPY | Facility: CLINIC | Age: 69
End: 2024-11-21
Payer: COMMERCIAL

## 2024-11-21 VITALS
OXYGEN SATURATION: 96 % | DIASTOLIC BLOOD PRESSURE: 80 MMHG | HEIGHT: 65 IN | RESPIRATION RATE: 18 BRPM | HEART RATE: 81 BPM | BODY MASS INDEX: 25.83 KG/M2 | SYSTOLIC BLOOD PRESSURE: 124 MMHG | WEIGHT: 155 LBS

## 2024-11-21 DIAGNOSIS — T45.1X5A CHEMOTHERAPY-INDUCED NEUROPATHY (MULTI): Primary | ICD-10-CM

## 2024-11-21 DIAGNOSIS — G62.0 CHEMOTHERAPY-INDUCED NEUROPATHY (MULTI): Primary | ICD-10-CM

## 2024-11-21 PROCEDURE — 99215 OFFICE O/P EST HI 40 MIN: CPT | Performed by: PHYSICIAN ASSISTANT

## 2024-11-21 PROCEDURE — 1159F MED LIST DOCD IN RCRD: CPT | Performed by: PHYSICIAN ASSISTANT

## 2024-11-21 PROCEDURE — 1036F TOBACCO NON-USER: CPT | Performed by: PHYSICIAN ASSISTANT

## 2024-11-21 PROCEDURE — 3008F BODY MASS INDEX DOCD: CPT | Performed by: PHYSICIAN ASSISTANT

## 2024-11-21 ASSESSMENT — ENCOUNTER SYMPTOMS
RESPIRATORY NEGATIVE: 1
PSYCHIATRIC NEGATIVE: 1
NUMBNESS: 1
ALLERGIC/IMMUNOLOGIC NEGATIVE: 1
CONSTITUTIONAL NEGATIVE: 1
HEMATOLOGIC/LYMPHATIC NEGATIVE: 1
GASTROINTESTINAL NEGATIVE: 1
PAIN: 1
CARDIOVASCULAR NEGATIVE: 1
ENDOCRINE NEGATIVE: 1
EYES NEGATIVE: 1
MUSCULOSKELETAL NEGATIVE: 1

## 2024-11-21 ASSESSMENT — PAIN SCALES - GENERAL
PAINLEVEL_OUTOF10: 5
PAINLEVEL_OUTOF10: 5 - MODERATE PAIN

## 2024-11-21 ASSESSMENT — PATIENT HEALTH QUESTIONNAIRE - PHQ9
SUM OF ALL RESPONSES TO PHQ9 QUESTIONS 1 & 2: 0
1. LITTLE INTEREST OR PLEASURE IN DOING THINGS: NOT AT ALL
2. FEELING DOWN, DEPRESSED OR HOPELESS: NOT AT ALL

## 2024-11-21 ASSESSMENT — PAIN - FUNCTIONAL ASSESSMENT: PAIN_FUNCTIONAL_ASSESSMENT: 0-10

## 2024-11-21 ASSESSMENT — LIFESTYLE VARIABLES
HOW MANY STANDARD DRINKS CONTAINING ALCOHOL DO YOU HAVE ON A TYPICAL DAY: PATIENT DOES NOT DRINK
HOW OFTEN DO YOU HAVE A DRINK CONTAINING ALCOHOL: NEVER
AUDIT-C TOTAL SCORE: 0
HOW OFTEN DO YOU HAVE SIX OR MORE DRINKS ON ONE OCCASION: NEVER
SKIP TO QUESTIONS 9-10: 1

## 2024-11-21 NOTE — PROGRESS NOTES
Subjective   Patient ID: Whitley Cohn is a 69 y.o. female who presents for Pain (Scrambler therapy).  Patient is a 68-year-old female with chemotherapy-induced peripheral neuropathy patient has been in remission from her non-Hodgkin's lymphoma.  Patient has not utilize any Lyrica or gabapentin's in greater than 30 days.    Patient reports her symptoms of neuropathic pain tightness burning and neuropathic symptoms in her lower extremities. She reports symptoms as a 4-5/10.  There was reduced symptoms in the shin area not much reduction in the feet.  Symptoms have returned to the same distributional pattern but at a less intensity    Pain      Review of Systems   Constitutional: Negative.    HENT: Negative.     Eyes: Negative.    Respiratory: Negative.     Cardiovascular: Negative.    Gastrointestinal: Negative.    Endocrine: Negative.    Genitourinary: Negative.    Musculoskeletal: Negative.    Skin: Negative.    Allergic/Immunologic: Negative.    Neurological:  Positive for numbness.   Hematological: Negative.    Psychiatric/Behavioral: Negative.         Objective   Physical Exam  Musculoskeletal:        Legs:        Assessment/Plan   Problem List Items Addressed This Visit             ICD-10-CM    Chemotherapy-induced neuropathy (Multi) - Primary G62.0, T45.1X5A     TREATMENT PLAN:  Day [ 4 ] of Scrambler Therapy.   Verbal consent obtained.  Session initiated by myself with proper electrode/lead placement to applicable dermatomes.   Patient tolerated treatment well .  Encouraged patient to keep a diary or log of symptoms to monitor for any changes in pain, sensation, etc.    Recommend follow-up as needed.       PROCEDURE NOTE:  Treatment [ 4 ] with Scrambler therapy was initiated by myself.   Verbal consent obtained from patient.  Therapy start time: [ 231 pm  ]   Leads were applied to: [ bilateral L4 and S1 ]  therapeutic dose :  [1100 BLE.    ]     Patient tolerated treatment [ well  ] .  Pain reported at start  of therapy session was about a [ 4-5 /10 ].  Pain reported at end of therapy session;  [  3-4/10 ] .  Leads were removed and patient left the office without any difficulty.  60 min total scrambler treatment time       Lewis Pozo PA-C 11/21/24 2:24 PM

## 2024-11-22 ENCOUNTER — OFFICE VISIT (OUTPATIENT)
Dept: PAIN MEDICINE | Facility: CLINIC | Age: 69
End: 2024-11-22
Payer: COMMERCIAL

## 2024-11-22 VITALS
SYSTOLIC BLOOD PRESSURE: 118 MMHG | RESPIRATION RATE: 16 BRPM | BODY MASS INDEX: 25.79 KG/M2 | DIASTOLIC BLOOD PRESSURE: 80 MMHG | WEIGHT: 155 LBS | HEART RATE: 91 BPM | OXYGEN SATURATION: 96 %

## 2024-11-22 DIAGNOSIS — T45.1X5A CHEMOTHERAPY-INDUCED NEUROPATHY (MULTI): Primary | ICD-10-CM

## 2024-11-22 DIAGNOSIS — G62.0 CHEMOTHERAPY-INDUCED NEUROPATHY (MULTI): Primary | ICD-10-CM

## 2024-11-22 PROCEDURE — 99215 OFFICE O/P EST HI 40 MIN: CPT | Performed by: PHYSICIAN ASSISTANT

## 2024-11-22 PROCEDURE — 1159F MED LIST DOCD IN RCRD: CPT | Performed by: PHYSICIAN ASSISTANT

## 2024-11-22 PROCEDURE — 1160F RVW MEDS BY RX/DR IN RCRD: CPT | Performed by: PHYSICIAN ASSISTANT

## 2024-11-22 PROCEDURE — 1125F AMNT PAIN NOTED PAIN PRSNT: CPT | Performed by: PHYSICIAN ASSISTANT

## 2024-11-22 PROCEDURE — 1036F TOBACCO NON-USER: CPT | Performed by: PHYSICIAN ASSISTANT

## 2024-11-22 ASSESSMENT — ENCOUNTER SYMPTOMS
NUMBNESS: 1
MUSCULOSKELETAL NEGATIVE: 1
ENDOCRINE NEGATIVE: 1
EYES NEGATIVE: 1
HEMATOLOGIC/LYMPHATIC NEGATIVE: 1
GASTROINTESTINAL NEGATIVE: 1
RESPIRATORY NEGATIVE: 1
PAIN: 1
CARDIOVASCULAR NEGATIVE: 1
ALLERGIC/IMMUNOLOGIC NEGATIVE: 1
PSYCHIATRIC NEGATIVE: 1
CONSTITUTIONAL NEGATIVE: 1

## 2024-11-22 ASSESSMENT — PATIENT HEALTH QUESTIONNAIRE - PHQ9
2. FEELING DOWN, DEPRESSED OR HOPELESS: NOT AT ALL
SUM OF ALL RESPONSES TO PHQ9 QUESTIONS 1 AND 2: 0
1. LITTLE INTEREST OR PLEASURE IN DOING THINGS: NOT AT ALL

## 2024-11-22 ASSESSMENT — PAIN - FUNCTIONAL ASSESSMENT: PAIN_FUNCTIONAL_ASSESSMENT: 0-10

## 2024-11-22 ASSESSMENT — PAIN SCALES - GENERAL
PAINLEVEL_OUTOF10: 4
PAINLEVEL_OUTOF10: 3

## 2024-11-22 NOTE — PROGRESS NOTES
Subjective   Patient ID: Whitley Cohn is a 69 y.o. female who presents for No chief complaint on file..  Patient is a 68-year-old female with chemotherapy-induced peripheral neuropathy patient has been in remission from her non-Hodgkin's lymphoma.  Patient has not utilize any Lyrica or gabapentin's in greater than 30 days.    Patient reports her symptoms of neuropathic pain tightness burning and neuropathic symptoms in her lower extremities. She reports symptoms as a 43-4/10.  Patient has no symptoms above the ankle.  He is noticing some increased tingling sensations in the feet as well plantar surface.     Pain      Review of Systems   Constitutional: Negative.    HENT: Negative.     Eyes: Negative.    Respiratory: Negative.     Cardiovascular: Negative.    Gastrointestinal: Negative.    Endocrine: Negative.    Genitourinary: Negative.    Musculoskeletal: Negative.    Skin: Negative.    Allergic/Immunologic: Negative.    Neurological:  Positive for numbness.   Hematological: Negative.    Psychiatric/Behavioral: Negative.         Objective   Physical Exam  Musculoskeletal:        Legs:        Assessment/Plan   Problem List Items Addressed This Visit             ICD-10-CM    Chemotherapy-induced neuropathy (Multi) - Primary G62.0, T45.1X5A   TREATMENT PLAN:  Day [ 5 ] of Scrambler Therapy.   Verbal consent obtained.  Session initiated by myself with proper electrode/lead placement to applicable dermatomes.   Patient tolerated treatment well .  Encouraged patient to keep a diary or log of symptoms to monitor for any changes in pain, sensation, etc.    Recommend follow-up as needed.       PROCEDURE NOTE:  Treatment [ 5 ] with Scrambler therapy was initiated by myself.   Verbal consent obtained from patient.  Therapy start time: [ 231 pm  ]   Leads were applied to: [ bilateral L4 and S1 ]  therapeutic dose :  [1000 BLE.    ]     Patient tolerated treatment [ well  ] .  Pain reported at start of therapy session was  about a [ 3-4 /10 ].  Pain reported at end of therapy session;  [  3/10 ] .  Leads were removed and patient left the office without any difficulty.  60 min total scrambler treatment time            Lewis Pozo PA-C 11/22/24 1:43 PM

## 2024-11-25 ENCOUNTER — APPOINTMENT (OUTPATIENT)
Dept: PHYSICAL THERAPY | Facility: CLINIC | Age: 69
End: 2024-11-25
Payer: COMMERCIAL

## 2024-11-25 ENCOUNTER — OFFICE VISIT (OUTPATIENT)
Dept: PAIN MEDICINE | Facility: CLINIC | Age: 69
End: 2024-11-25
Payer: COMMERCIAL

## 2024-11-25 VITALS
HEIGHT: 65 IN | DIASTOLIC BLOOD PRESSURE: 76 MMHG | HEART RATE: 89 BPM | OXYGEN SATURATION: 98 % | SYSTOLIC BLOOD PRESSURE: 136 MMHG | WEIGHT: 155 LBS | RESPIRATION RATE: 16 BRPM | BODY MASS INDEX: 25.83 KG/M2

## 2024-11-25 DIAGNOSIS — T45.1X5A CHEMOTHERAPY-INDUCED NEUROPATHY (MULTI): Primary | ICD-10-CM

## 2024-11-25 DIAGNOSIS — G62.0 CHEMOTHERAPY-INDUCED NEUROPATHY (MULTI): Primary | ICD-10-CM

## 2024-11-25 PROCEDURE — 1159F MED LIST DOCD IN RCRD: CPT | Performed by: PHYSICIAN ASSISTANT

## 2024-11-25 PROCEDURE — 1125F AMNT PAIN NOTED PAIN PRSNT: CPT | Performed by: PHYSICIAN ASSISTANT

## 2024-11-25 PROCEDURE — 1160F RVW MEDS BY RX/DR IN RCRD: CPT | Performed by: PHYSICIAN ASSISTANT

## 2024-11-25 PROCEDURE — 99215 OFFICE O/P EST HI 40 MIN: CPT | Performed by: PHYSICIAN ASSISTANT

## 2024-11-25 PROCEDURE — 1036F TOBACCO NON-USER: CPT | Performed by: PHYSICIAN ASSISTANT

## 2024-11-25 PROCEDURE — 3008F BODY MASS INDEX DOCD: CPT | Performed by: PHYSICIAN ASSISTANT

## 2024-11-25 ASSESSMENT — ENCOUNTER SYMPTOMS
HEMATOLOGIC/LYMPHATIC NEGATIVE: 1
NUMBNESS: 1
PSYCHIATRIC NEGATIVE: 1
MUSCULOSKELETAL NEGATIVE: 1
CARDIOVASCULAR NEGATIVE: 1
PAIN: 1
ALLERGIC/IMMUNOLOGIC NEGATIVE: 1
CONSTITUTIONAL NEGATIVE: 1
RESPIRATORY NEGATIVE: 1
EYES NEGATIVE: 1
ENDOCRINE NEGATIVE: 1
GASTROINTESTINAL NEGATIVE: 1

## 2024-11-25 ASSESSMENT — PAIN SCALES - GENERAL
PAINLEVEL_OUTOF10: 6
PAINLEVEL_OUTOF10: 6

## 2024-11-25 ASSESSMENT — PAIN - FUNCTIONAL ASSESSMENT: PAIN_FUNCTIONAL_ASSESSMENT: 0-10

## 2024-11-25 NOTE — PROGRESS NOTES
Subjective   Patient ID: Whitley Cohn is a 69 y.o. female who presents for Foot Pain.  Patient is a 68-year-old female with chemotherapy-induced peripheral neuropathy patient has been in remission from her non-Hodgkin's lymphoma.  Patient has not utilize any Lyrica or gabapentin's in greater than 30 days.    Patient reports her symptoms of neuropathic pain tightness burning and neuropathic symptoms in her lower extremities. She reports symptoms as a 5-6/10.  Patient has no symptoms above the ankle.      Pain        Review of Systems   Constitutional: Negative.    HENT: Negative.     Eyes: Negative.    Respiratory: Negative.     Cardiovascular: Negative.    Gastrointestinal: Negative.    Endocrine: Negative.    Genitourinary: Negative.    Musculoskeletal: Negative.    Skin: Negative.    Allergic/Immunologic: Negative.    Neurological:  Positive for numbness.   Hematological: Negative.    Psychiatric/Behavioral: Negative.         Objective   Physical Exam  Musculoskeletal:        Legs:          Assessment/Plan   Problem List Items Addressed This Visit             ICD-10-CM    Chemotherapy-induced neuropathy (Multi) - Primary G62.0, T45.1X5A   TREATMENT PLAN:  Day [ 6 ] of Scrambler Therapy.   Verbal consent obtained.  Session initiated by myself with proper electrode/lead placement to applicable dermatomes.   Patient tolerated treatment well .  Encouraged patient to keep a diary or log of symptoms to monitor for any changes in pain, sensation, etc.    Recommend follow-up as needed.       PROCEDURE NOTE:  Treatment [ 6 ] with Scrambler therapy was initiated by myself.   Verbal consent obtained from patient.  Therapy start time: [ 218 pm  ]   Leads were applied to: [ bilateral L4 and S1 ]  therapeutic dose :  [1100 BLE.    ]     Patient tolerated treatment [ well  ] .  Pain reported at start of therapy session was about a [ 5-6 /10 ].  Pain reported at end of therapy session;  [  5/10 ] .  Leads were removed and  patient left the office without any difficulty.  60 min total scrambler treatment time          11/25/24 3:34 PM

## 2024-11-26 ENCOUNTER — APPOINTMENT (OUTPATIENT)
Dept: PAIN MEDICINE | Facility: CLINIC | Age: 69
End: 2024-11-26
Payer: COMMERCIAL

## 2024-11-27 ENCOUNTER — APPOINTMENT (OUTPATIENT)
Dept: PAIN MEDICINE | Facility: CLINIC | Age: 69
End: 2024-11-27
Payer: COMMERCIAL

## 2024-12-02 ENCOUNTER — PATIENT MESSAGE (OUTPATIENT)
Dept: PAIN MEDICINE | Facility: CLINIC | Age: 69
End: 2024-12-02

## 2024-12-02 ENCOUNTER — APPOINTMENT (OUTPATIENT)
Dept: PAIN MEDICINE | Facility: CLINIC | Age: 69
End: 2024-12-02
Payer: COMMERCIAL

## 2024-12-02 ENCOUNTER — APPOINTMENT (OUTPATIENT)
Dept: INTEGRATIVE MEDICINE | Facility: CLINIC | Age: 69
End: 2024-12-02
Payer: COMMERCIAL

## 2024-12-02 ENCOUNTER — APPOINTMENT (OUTPATIENT)
Dept: PHYSICAL THERAPY | Facility: CLINIC | Age: 69
End: 2024-12-02
Payer: COMMERCIAL

## 2024-12-03 ENCOUNTER — APPOINTMENT (OUTPATIENT)
Dept: PAIN MEDICINE | Facility: CLINIC | Age: 69
End: 2024-12-03
Payer: COMMERCIAL

## 2024-12-04 ENCOUNTER — APPOINTMENT (OUTPATIENT)
Dept: PHYSICAL THERAPY | Facility: CLINIC | Age: 69
End: 2024-12-04
Payer: COMMERCIAL

## 2024-12-04 ENCOUNTER — APPOINTMENT (OUTPATIENT)
Dept: INTEGRATIVE MEDICINE | Facility: CLINIC | Age: 69
End: 2024-12-04
Payer: COMMERCIAL

## 2024-12-09 ENCOUNTER — APPOINTMENT (OUTPATIENT)
Dept: PHYSICAL THERAPY | Facility: CLINIC | Age: 69
End: 2024-12-09
Payer: COMMERCIAL

## 2024-12-16 ENCOUNTER — APPOINTMENT (OUTPATIENT)
Dept: PAIN MEDICINE | Facility: CLINIC | Age: 69
End: 2024-12-16
Payer: COMMERCIAL

## 2024-12-16 ENCOUNTER — TREATMENT (OUTPATIENT)
Dept: PHYSICAL THERAPY | Facility: CLINIC | Age: 69
End: 2024-12-16
Payer: COMMERCIAL

## 2024-12-16 DIAGNOSIS — M79.2 NEUROPATHIC PAIN: ICD-10-CM

## 2024-12-16 DIAGNOSIS — M54.16 LUMBAR RADICULOPATHY: ICD-10-CM

## 2024-12-16 PROCEDURE — 97140 MANUAL THERAPY 1/> REGIONS: CPT | Mod: GP

## 2024-12-16 PROCEDURE — 97110 THERAPEUTIC EXERCISES: CPT | Mod: GP

## 2024-12-16 NOTE — PROGRESS NOTES
"Physical Therapy    Physical Therapy Treatment    Patient Name: Whitley Cohn  MRN: [id number]  Today's Date: 12/16/2024    Time Entry:   Time Calculation  Start Time: 1520  Stop Time: 1604  Time Calculation (min): 44 min     PT Therapeutic Procedures Time Entry  Manual Therapy Time Entry: 15  Therapeutic Exercise Time Entry: 29                   Assessment:   Pt needs cues for core engagement. Tolerated manual well. Had some gait disturbance after getting off mat table, improved with walking and nustep.     Plan:   Reverse clams, bridge on ball    Current Problem  1. Lumbar radiculopathy  Follow Up In Physical Therapy      2. Neuropathic pain  Follow Up In Physical Therapy          Subjective    \"I think the exercises help because I haven't been doing them and my sx in the feet are worse.\"  Patient's brother passed away and she was in charge of everything.   Precautions   Hx of cancer   Pain   Inner heel L > R pressure/tightness, some lower back tightness L side    Objective   Tenderness L SIJ    Treatments:  Manual therapy   Lat belt mobs, long axis distraction LLE   Prone SIJ mobs w/hip IR/ER stretching  Therapeutic Exercise   Prone hip ext, knee bent 3\" x 10 ea   Hooklying add iso 5\" 2 x 10   Hooklying abd iso 5\" 2 x 10   TA w/BKFO 2 x 10 ea    Clamshell 5\" 2 x 10 ea   TA w/bridge x 10, toes up   PPT w/sequential march x 10 rounds   Nustep x 7 min       OP EDUCATION:       Goals:  Active       PT Problem       Patient will be independent with home exercise program for home maintenance.        Start:  11/05/24    Expected End:  12/05/24            Pt will demonstrate improved hip extension during gait to indicate improved lumbopelvic mobility.        Start:  11/05/24    Expected End:  12/05/24            Pt will report decreased SIJ tenderness to indicate improved mobility and LE strength for increased exercise participation.        Start:  11/05/24    Expected End:  01/04/25            Patient will decrease score " on Modified CHARLA by 10 % to indicate decreased pain and increased functional level.         Start:  11/05/24    Expected End:  01/04/25

## 2024-12-17 ENCOUNTER — APPOINTMENT (OUTPATIENT)
Dept: PAIN MEDICINE | Facility: CLINIC | Age: 69
End: 2024-12-17
Payer: COMMERCIAL

## 2024-12-18 ENCOUNTER — APPOINTMENT (OUTPATIENT)
Dept: PAIN MEDICINE | Facility: CLINIC | Age: 69
End: 2024-12-18
Payer: COMMERCIAL

## 2024-12-19 ENCOUNTER — APPOINTMENT (OUTPATIENT)
Dept: PAIN MEDICINE | Facility: CLINIC | Age: 69
End: 2024-12-19
Payer: COMMERCIAL

## 2024-12-20 ENCOUNTER — APPOINTMENT (OUTPATIENT)
Dept: PAIN MEDICINE | Facility: CLINIC | Age: 69
End: 2024-12-20
Payer: COMMERCIAL

## 2025-01-06 ENCOUNTER — APPOINTMENT (OUTPATIENT)
Dept: PAIN MEDICINE | Facility: CLINIC | Age: 70
End: 2025-01-06
Payer: COMMERCIAL

## 2025-01-07 ENCOUNTER — APPOINTMENT (OUTPATIENT)
Dept: PAIN MEDICINE | Facility: CLINIC | Age: 70
End: 2025-01-07
Payer: COMMERCIAL

## 2025-01-08 ENCOUNTER — APPOINTMENT (OUTPATIENT)
Dept: PHYSICAL THERAPY | Facility: CLINIC | Age: 70
End: 2025-01-08
Payer: COMMERCIAL

## 2025-01-08 ENCOUNTER — APPOINTMENT (OUTPATIENT)
Dept: PAIN MEDICINE | Facility: CLINIC | Age: 70
End: 2025-01-08
Payer: COMMERCIAL

## 2025-01-09 ENCOUNTER — APPOINTMENT (OUTPATIENT)
Dept: PAIN MEDICINE | Facility: CLINIC | Age: 70
End: 2025-01-09
Payer: COMMERCIAL

## 2025-01-10 ENCOUNTER — APPOINTMENT (OUTPATIENT)
Dept: PAIN MEDICINE | Facility: CLINIC | Age: 70
End: 2025-01-10
Payer: COMMERCIAL

## 2025-01-13 ENCOUNTER — APPOINTMENT (OUTPATIENT)
Dept: PAIN MEDICINE | Facility: CLINIC | Age: 70
End: 2025-01-13
Payer: COMMERCIAL

## 2025-01-14 ENCOUNTER — APPOINTMENT (OUTPATIENT)
Dept: PAIN MEDICINE | Facility: CLINIC | Age: 70
End: 2025-01-14
Payer: COMMERCIAL

## 2025-01-15 ENCOUNTER — APPOINTMENT (OUTPATIENT)
Dept: PAIN MEDICINE | Facility: CLINIC | Age: 70
End: 2025-01-15
Payer: COMMERCIAL

## 2025-01-16 ENCOUNTER — APPOINTMENT (OUTPATIENT)
Dept: PAIN MEDICINE | Facility: CLINIC | Age: 70
End: 2025-01-16
Payer: COMMERCIAL

## 2025-01-17 ENCOUNTER — APPOINTMENT (OUTPATIENT)
Dept: PAIN MEDICINE | Facility: CLINIC | Age: 70
End: 2025-01-17
Payer: COMMERCIAL

## 2025-02-04 ENCOUNTER — OFFICE VISIT (OUTPATIENT)
Dept: CARDIOLOGY | Facility: CLINIC | Age: 70
End: 2025-02-04
Payer: COMMERCIAL

## 2025-02-04 VITALS
HEIGHT: 65 IN | SYSTOLIC BLOOD PRESSURE: 120 MMHG | DIASTOLIC BLOOD PRESSURE: 76 MMHG | HEART RATE: 77 BPM | OXYGEN SATURATION: 95 % | WEIGHT: 165.8 LBS | BODY MASS INDEX: 27.63 KG/M2

## 2025-02-04 DIAGNOSIS — R06.09 DOE (DYSPNEA ON EXERTION): ICD-10-CM

## 2025-02-04 DIAGNOSIS — I25.119 CORONARY ARTERY DISEASE WITH ANGINA PECTORIS, UNSPECIFIED VESSEL OR LESION TYPE, UNSPECIFIED WHETHER NATIVE OR TRANSPLANTED HEART: ICD-10-CM

## 2025-02-04 DIAGNOSIS — I50.30 HEART FAILURE WITH PRESERVED EJECTION FRACTION, UNSPECIFIED HF CHRONICITY: ICD-10-CM

## 2025-02-04 DIAGNOSIS — I25.10 CORONARY ARTERY CALCIFICATION: ICD-10-CM

## 2025-02-04 DIAGNOSIS — R60.9 EDEMA, UNSPECIFIED TYPE: Primary | ICD-10-CM

## 2025-02-04 DIAGNOSIS — Q24.8 LEFT VENTRICULAR OUTFLOW TRACT OBSTRUCTION (HHS-HCC): ICD-10-CM

## 2025-02-04 PROCEDURE — 1126F AMNT PAIN NOTED NONE PRSNT: CPT | Performed by: INTERNAL MEDICINE

## 2025-02-04 PROCEDURE — 1036F TOBACCO NON-USER: CPT | Performed by: INTERNAL MEDICINE

## 2025-02-04 PROCEDURE — 99214 OFFICE O/P EST MOD 30 MIN: CPT | Mod: 25 | Performed by: INTERNAL MEDICINE

## 2025-02-04 PROCEDURE — 3008F BODY MASS INDEX DOCD: CPT | Performed by: INTERNAL MEDICINE

## 2025-02-04 PROCEDURE — 93010 ELECTROCARDIOGRAM REPORT: CPT | Performed by: INTERNAL MEDICINE

## 2025-02-04 PROCEDURE — 1159F MED LIST DOCD IN RCRD: CPT | Performed by: INTERNAL MEDICINE

## 2025-02-04 PROCEDURE — 99214 OFFICE O/P EST MOD 30 MIN: CPT | Performed by: INTERNAL MEDICINE

## 2025-02-04 PROCEDURE — 93005 ELECTROCARDIOGRAM TRACING: CPT | Performed by: INTERNAL MEDICINE

## 2025-02-04 RX ORDER — FUROSEMIDE 20 MG/1
20 TABLET ORAL DAILY PRN
Qty: 30 TABLET | Refills: 1 | Status: SHIPPED | OUTPATIENT
Start: 2025-02-04 | End: 2026-02-04

## 2025-02-04 RX ORDER — AMOXICILLIN 500 MG/1
1 CAPSULE ORAL
COMMUNITY
Start: 2025-02-01

## 2025-02-04 ASSESSMENT — ENCOUNTER SYMPTOMS
NAUSEA: 0
ALTERED MENTAL STATUS: 0
LOSS OF SENSATION IN FEET: 0
FEVER: 0
CHILLS: 0
COUGH: 0
MEMORY LOSS: 0
VOMITING: 0
DYSURIA: 0
WHEEZING: 0
FALLS: 0
MYALGIAS: 0
HEMATURIA: 0
DIARRHEA: 0
HEADACHES: 0
ABDOMINAL PAIN: 0
CONSTIPATION: 0
HEMOPTYSIS: 0
DEPRESSION: 0
BLOATING: 0
OCCASIONAL FEELINGS OF UNSTEADINESS: 0

## 2025-02-04 ASSESSMENT — COLUMBIA-SUICIDE SEVERITY RATING SCALE - C-SSRS
2. HAVE YOU ACTUALLY HAD ANY THOUGHTS OF KILLING YOURSELF?: NO
6. HAVE YOU EVER DONE ANYTHING, STARTED TO DO ANYTHING, OR PREPARED TO DO ANYTHING TO END YOUR LIFE?: NO
1. IN THE PAST MONTH, HAVE YOU WISHED YOU WERE DEAD OR WISHED YOU COULD GO TO SLEEP AND NOT WAKE UP?: NO

## 2025-02-04 ASSESSMENT — PATIENT HEALTH QUESTIONNAIRE - PHQ9
SUM OF ALL RESPONSES TO PHQ9 QUESTIONS 1 AND 2: 1
2. FEELING DOWN, DEPRESSED OR HOPELESS: SEVERAL DAYS
10. IF YOU CHECKED OFF ANY PROBLEMS, HOW DIFFICULT HAVE THESE PROBLEMS MADE IT FOR YOU TO DO YOUR WORK, TAKE CARE OF THINGS AT HOME, OR GET ALONG WITH OTHER PEOPLE: NOT DIFFICULT AT ALL
1. LITTLE INTEREST OR PLEASURE IN DOING THINGS: NOT AT ALL

## 2025-02-04 ASSESSMENT — PAIN SCALES - GENERAL: PAINLEVEL_OUTOF10: 0-NO PAIN

## 2025-02-06 LAB
ATRIAL RATE: 78 BPM
P AXIS: 19 DEGREES
P OFFSET: 197 MS
P ONSET: 139 MS
PR INTERVAL: 150 MS
Q ONSET: 214 MS
QRS COUNT: 12 BEATS
QRS DURATION: 154 MS
QT INTERVAL: 442 MS
QTC CALCULATION(BAZETT): 503 MS
QTC FREDERICIA: 482 MS
R AXIS: -33 DEGREES
T AXIS: 127 DEGREES
T OFFSET: 435 MS
VENTRICULAR RATE: 78 BPM

## 2025-02-10 ENCOUNTER — APPOINTMENT (OUTPATIENT)
Dept: CARDIOLOGY | Facility: CLINIC | Age: 70
End: 2025-02-10
Payer: COMMERCIAL

## 2025-02-17 ENCOUNTER — HOSPITAL ENCOUNTER (OUTPATIENT)
Dept: VASCULAR MEDICINE | Facility: CLINIC | Age: 70
Discharge: HOME | End: 2025-02-17
Payer: COMMERCIAL

## 2025-02-17 ENCOUNTER — HOSPITAL ENCOUNTER (OUTPATIENT)
Dept: CARDIOLOGY | Facility: CLINIC | Age: 70
Discharge: HOME | End: 2025-02-17
Payer: COMMERCIAL

## 2025-02-17 DIAGNOSIS — R06.09 DOE (DYSPNEA ON EXERTION): ICD-10-CM

## 2025-02-17 DIAGNOSIS — R60.0 LOCALIZED EDEMA: ICD-10-CM

## 2025-02-17 DIAGNOSIS — R60.9 EDEMA, UNSPECIFIED TYPE: ICD-10-CM

## 2025-02-17 DIAGNOSIS — I42.1 OBSTRUCTIVE HYPERTROPHIC CARDIOMYOPATHY (MULTI): ICD-10-CM

## 2025-02-17 DIAGNOSIS — Q24.8 LEFT VENTRICULAR OUTFLOW TRACT OBSTRUCTION (HHS-HCC): Primary | ICD-10-CM

## 2025-02-17 DIAGNOSIS — I50.30 HEART FAILURE WITH PRESERVED EJECTION FRACTION, UNSPECIFIED HF CHRONICITY: ICD-10-CM

## 2025-02-17 DIAGNOSIS — I35.0 AORTIC VALVE STENOSIS, ETIOLOGY OF CARDIAC VALVE DISEASE UNSPECIFIED: ICD-10-CM

## 2025-02-17 DIAGNOSIS — Q24.8 LEFT VENTRICULAR OUTFLOW TRACT OBSTRUCTION (HHS-HCC): ICD-10-CM

## 2025-02-17 LAB
AORTIC VALVE MEAN GRADIENT: 42 MMHG
AORTIC VALVE PEAK VELOCITY: 4.16 M/S
AV PEAK GRADIENT: 69 MMHG
EJECTION FRACTION: 63 %
LEFT ATRIUM VOLUME AREA LENGTH INDEX BSA: 51.8 ML/M2
LEFT VENTRICLE INTERNAL DIMENSION DIASTOLE: 3.4 CM (ref 3.5–6)
LEFT VENTRICULAR OUTFLOW TRACT DIAMETER: 2.32 CM
MITRAL VALVE E/A RATIO: 1.13
RIGHT VENTRICLE FREE WALL PEAK S': 12 CM/S
RIGHT VENTRICLE PEAK SYSTOLIC PRESSURE: 39.4 MMHG
TRICUSPID ANNULAR PLANE SYSTOLIC EXCURSION: 1.8 CM

## 2025-02-17 PROCEDURE — 93970 EXTREMITY STUDY: CPT | Performed by: SURGERY

## 2025-02-17 PROCEDURE — 93306 TTE W/DOPPLER COMPLETE: CPT | Performed by: INTERNAL MEDICINE

## 2025-02-17 PROCEDURE — 93970 EXTREMITY STUDY: CPT

## 2025-02-17 PROCEDURE — 93306 TTE W/DOPPLER COMPLETE: CPT

## 2025-02-20 ENCOUNTER — CLINICAL SUPPORT (OUTPATIENT)
Dept: INTEGRATIVE MEDICINE | Facility: CLINIC | Age: 70
End: 2025-02-20

## 2025-02-20 PROCEDURE — MASSCHA MASSAGE-CHAIR: Performed by: MASSAGE THERAPIST

## 2025-02-20 NOTE — PROGRESS NOTES
Integrative Oncology Reiki Visit 4    Session Start   Date: 02.20.2025  Time: 1145    Session End   Date: 02.20.2025  Time: 1230    Assessment of patient:  Identified patient via full name and date of birth  Arrived ambulatory, steady gait, no assistive device   Noted benefits from last session: sense of peace and relaxation  Swelling in the feet and lower legs, especially in the medial aspect  She drinks Dandelion Tea, elevates lower extremities, watches water intake  Would like to learn more holistic practices to help relieve the swelling  This Reiki practitioner advised she could wear compression hose, pump ankles to promote circulation, using epsom salts in a small quantity in a foot soak or soaking feet, ankles, and lower extremity in the tub  She asked about lymph drainage: see notation below     WellbeingPre 5  CopingPre 5  Pain Pre 5  TirednessPre 5  AnxietyPre 5  DepressionPre 5  StressPre 5  NauseaPre 2    Session goals:   To promote relaxation, comfort, wellbeing, relief from pain, tiredness, anxiety, stress, and nausea    Patient Intention:   Peace    Provider Intention:   Patient's highest and greatest healing and wellbeing     Reiki procedure    Hand positions:   Supine Only:   Hands On: head: frontal, temporal, occiput, posterior neck, anterior and posterior shoulders, hands, mid-back, lower back, lateral hips, knees, ankles, dorsal and planter surfaces of feet  Hands Off/Hovering: anterior chest abdomen, pelvic region     Duration of session (minutes): 35    Hands on or hands off  [] hands on only  [] hands off only  [x] both     Were you interrupted during the session? No    Was music used during the session? Yes    What music recording was used?   [employer]: Spa Machine    Did the patient talk with you during the Reiki session? Yes  For a little bit at the beginning of the session    Did the patient fall asleep during the Reiki session? No    Do you have any comments about the session, such as the  patient's reaction, or your experience during the Reiki?   Patient rested with eyes closed, relaxed facial and body posture, and deep, even breathing     Do you feel that the patient benefited from the treatment today? Yes  Noted she felt relaxed  Wellbeing, coping, pain, anxiety, stress, depression, tiredness, and nausea improved    WellbeingPost 7  CopingPost 7  PainPost 3  TirednessPost 4  AnxietyPost 3  DepressionPost 3  StressPost 3  NauseaPost 1    She asked about lymph drainage: this Reiki practitioner advised that OT/PT at  can provide this service at certain locations  She asked which locations: this Reiki practitioner noted [facility] was the only location she is aware of, but if she asks her PCP to order Lymph Drainage, scheduling will call her to schedule and they will be able to advise of locations    Scheduled for:  Integrative Oncology Symptom Management Clinic: Massage Therapy: none scheduled  Integrative Oncology Reiki: 03.06  Integrative Oncology Consult: Dr. Hudson Ly: none scheduled  Chiropractic: Dr. Nannette Boyd: none scheduled    Note signed by Nancy Menendez LMT on 02/22/25

## 2025-03-06 ENCOUNTER — APPOINTMENT (OUTPATIENT)
Dept: INTEGRATIVE MEDICINE | Facility: CLINIC | Age: 70
End: 2025-03-06

## 2025-03-06 PROCEDURE — MASSCHA MASSAGE-CHAIR: Performed by: MASSAGE THERAPIST

## 2025-03-06 NOTE — PROGRESS NOTES
Integrative Oncology Reiki Visit 5 (patient arrived early and able to accommodate due to prior client/appointment cancelled)    Session Start   Date: 03.06.2025  Time: 1135    Session End   Date: 03.06.2025  Time: 1220    Assessment of patient:  Identified patient via full name and date of birth  Arrived ambulatory, steady gait, no assistive device   Noted benefits from last session: it was helpful, more relaxed, reduction of symptoms: tightness, tingling, and swelling in the lower extremities  Feeling okay  No complaint of pain;  constant tightness in feet and shins that increases as day progresses  Patient confirmed she is using compression hose, elevates lower extremities, pumps ankles to promote circulation, using epsom salts in a small quantity in a foot soak or soaking feet, ankles, and lower extremity in the tub; last session noted she drinks Dandelion Tea, and watches water intake  Attending OT appointments for lymphedema support at outside health system  Receives massage at an outside health system    WellbeingPre 6  CopingPre 6  Pain Pre 0  TirednessPre 6  AnxietyPre 6  DepressionPre 5  StressPre 8  NauseaPre 3    Session goals:   To promote relaxation, comfort, wellbeing, relief from pain, tiredness, anxiety, stress, and nausea    Patient Intention:   Healing    Provider Intention:   Patient's highest and greatest healing and wellbeing     Reiki procedure    Hand positions:   Supine Only:   Hands On: head: frontal, temporal, occiput, posterior neck, anterior and posterior shoulders, hands, mid-back, lower back, lateral hips, knees, ankles, dorsal and planter surfaces of feet  Hands Off/Hovering: anterior chest abdomen, pelvic region     Duration of session (minutes): 35    Hands on or hands off  [] hands on only  [] hands off only  [x] both     Were you interrupted during the session? No    Was music used during the session? Yes    What music recording was used?   [employer]: Spa Machine    Did the patient  talk with you during the Reiki session? Yes  For a little bit at the beginning of the session    Did the patient fall asleep during the Reiki session? Yes  Patient confirmed she fell asleep; this Reiki practitioner noted light snoring the last half of the session    Do you have any comments about the session, such as the patient's reaction, or your experience during the Reiki?   Patient rested with eyes closed, relaxed facial and body posture, and deep, even breathing     Do you feel that the patient benefited from the treatment today? Yes  She stated she was much better; more relaxed  Wellbeing, coping, pain, anxiety, stress, depression, tiredness, and nausea improved    WellbeingPost 8  CopingPost 8  PainPost 2  TirednessPost 3  AnxietyPost 2  DepressionPost 3  StressPost 3  NauseaPost 0    Scheduled for:  Integrative Oncology Symptom Management Clinic: Massage Therapy: none scheduled  Integrative Oncology Reiki: to be scheduled: her intention at end of session was to reschedule future appointments monthly  Integrative Oncology Consult: Dr. Hudson Ly: 04.16  Chiropractic: Dr. Nannette Boyd: none scheduled    Note signed by Nancy Menendez LMT on 03/07/25

## 2025-03-17 ENCOUNTER — APPOINTMENT (OUTPATIENT)
Dept: INTEGRATIVE MEDICINE | Facility: CLINIC | Age: 70
End: 2025-03-17
Payer: COMMERCIAL

## 2025-03-24 ENCOUNTER — APPOINTMENT (OUTPATIENT)
Dept: PAIN MEDICINE | Facility: CLINIC | Age: 70
End: 2025-03-24
Payer: COMMERCIAL

## 2025-03-25 ENCOUNTER — APPOINTMENT (OUTPATIENT)
Dept: PAIN MEDICINE | Facility: CLINIC | Age: 70
End: 2025-03-25
Payer: COMMERCIAL

## 2025-03-26 ENCOUNTER — APPOINTMENT (OUTPATIENT)
Dept: PAIN MEDICINE | Facility: CLINIC | Age: 70
End: 2025-03-26
Payer: COMMERCIAL

## 2025-03-27 ENCOUNTER — APPOINTMENT (OUTPATIENT)
Dept: PAIN MEDICINE | Facility: CLINIC | Age: 70
End: 2025-03-27
Payer: COMMERCIAL

## 2025-03-28 ENCOUNTER — APPOINTMENT (OUTPATIENT)
Dept: PAIN MEDICINE | Facility: CLINIC | Age: 70
End: 2025-03-28
Payer: COMMERCIAL

## 2025-03-31 ENCOUNTER — APPOINTMENT (OUTPATIENT)
Dept: PAIN MEDICINE | Facility: CLINIC | Age: 70
End: 2025-03-31
Payer: COMMERCIAL

## 2025-04-01 ENCOUNTER — APPOINTMENT (OUTPATIENT)
Dept: PAIN MEDICINE | Facility: CLINIC | Age: 70
End: 2025-04-01
Payer: COMMERCIAL

## 2025-04-02 ENCOUNTER — APPOINTMENT (OUTPATIENT)
Dept: PAIN MEDICINE | Facility: CLINIC | Age: 70
End: 2025-04-02
Payer: COMMERCIAL

## 2025-04-03 ENCOUNTER — APPOINTMENT (OUTPATIENT)
Dept: PAIN MEDICINE | Facility: CLINIC | Age: 70
End: 2025-04-03
Payer: COMMERCIAL

## 2025-04-04 ENCOUNTER — APPOINTMENT (OUTPATIENT)
Dept: PAIN MEDICINE | Facility: CLINIC | Age: 70
End: 2025-04-04
Payer: COMMERCIAL

## 2025-04-16 ENCOUNTER — APPOINTMENT (OUTPATIENT)
Dept: INTEGRATIVE MEDICINE | Facility: CLINIC | Age: 70
End: 2025-04-16
Payer: COMMERCIAL

## 2025-04-23 ENCOUNTER — APPOINTMENT (OUTPATIENT)
Dept: INTEGRATIVE MEDICINE | Facility: CLINIC | Age: 70
End: 2025-04-23
Payer: COMMERCIAL

## 2025-04-29 ENCOUNTER — PATIENT MESSAGE (OUTPATIENT)
Dept: PAIN MEDICINE | Facility: CLINIC | Age: 70
End: 2025-04-29
Payer: COMMERCIAL

## 2025-04-29 ENCOUNTER — TELEPHONE (OUTPATIENT)
Dept: PAIN MEDICINE | Facility: CLINIC | Age: 70
End: 2025-04-29
Payer: COMMERCIAL

## 2025-04-29 NOTE — TELEPHONE ENCOUNTER
Patient rescheduled for scrambler in August. Patient would like to know if you think that PEMF therapy (magnet therapy) would be helpful to decrease her pain in the mean time.

## 2025-05-08 ENCOUNTER — APPOINTMENT (OUTPATIENT)
Dept: INTEGRATIVE MEDICINE | Facility: CLINIC | Age: 70
End: 2025-05-08
Payer: COMMERCIAL

## 2025-05-08 PROCEDURE — MASSCHA MASSAGE-CHAIR: Performed by: MASSAGE THERAPIST

## 2025-05-08 NOTE — PROGRESS NOTES
Integrative Oncology Reiki Visit 6    Session Start   Date: 05.08.2025  Time: 0940 (previous session ran over)    Session End   Date: 05.08.2025  Time: 1025    Assessment of patient:  Identified patient via full name and date of birth  Arrived ambulatory, steady gait, no assistive device   Noted benefits from last session: relaxed and positive mindset  Experienced ups and downs  Minor swelling in the inner ankle, right more than left  Low salt use, hydrates, exercises at fitness center with focus on cardio and weights; water walking twice a week; yoga once per week  Feels a tightness in the shins and feet and that causes a challenge with walking  Working with a good PT  Intermittent dull pain in the left upper gluteals  Intermittent pressure feeling in the inner/medial plantar surface of the feet near heel    Self-care Noted in Previous Session  No complaint of pain;  constant tightness in feet and shins that increases as day progresses  Patient confirmed she is using compression hose, elevates lower extremities, pumps ankles to promote circulation, using epsom salts in a small quantity in a foot soak or soaking feet, ankles, and lower extremity in the tub; last session noted she drinks Dandelion Tea, and watches water intake  Attending OT appointments for lymphedema support at outside health system  Receives massage at an outside health system    WellbeingPre 5  CopingPre 5  Pain Pre 6  TirednessPre 5  AnxietyPre 7  DepressionPre 7  StressPre 7  NauseaPre 6    Session goals:   To promote relaxation, comfort, wellbeing, relief from pain, tiredness, anxiety, stress, and nausea    Patient Intention:   Healing the system    Provider Intention:   Patient's highest and greatest healing and wellbeing     Reiki procedure    Hand positions:   Supine Only:   Hands On: head: frontal, temporal, occiput, posterior neck, anterior and posterior shoulders, hands, mid-back, lower back, lateral hips, knees, ankles, dorsal and  planter surfaces of feet  Hands Off/Hovering: anterior chest abdomen, pelvic region     Duration of session (minutes): 35    Hands on or hands off  [] hands on only  [] hands off only  [x] both     Were you interrupted during the session? No    Was music used during the session? Yes    What music recording was used?   [employer]: Spa Machine    Did the patient talk with you during the Reiki session? Yes  During 75% of the session, quiet at the end of the session    Did the patient fall asleep during the Reiki session? No    Do you have any comments about the session, such as the patient's reaction, or your experience during the Reiki?   Patient rested with eyes closed, relaxed facial and body posture, and deep, even breathing     Do you feel that the patient benefited from the treatment today? Yes  She stated she was more relaxed  Wellbeing, coping, pain, anxiety, stress, depression, tiredness, and nausea improved    WellbeingPost 7  CopingPost 7  PainPost 4  TirednessPost 4  AnxietyPost 4  DepressionPost 4  StressPost 4  NauseaPost 3    Scheduled for:  Integrative Oncology Symptom Management Clinic: Massage Therapy: none scheduled  Integrative Oncology Reiki: 05.22, 06.05  Integrative Oncology Consult: Dr. Hudson Ly: 06.09  Chiropractic: Dr. Nannette Boyd: none scheduled    Note signed by Nancy Menendez LMT on 05/08/25

## 2025-05-12 ENCOUNTER — TELEPHONE (OUTPATIENT)
Dept: PHYSICAL MEDICINE AND REHAB | Facility: CLINIC | Age: 70
End: 2025-05-12
Payer: COMMERCIAL

## 2025-05-12 NOTE — TELEPHONE ENCOUNTER
I received a message from [facility] in [place] that patient showed up for appointment today that is actually scheduled for tomorrow. I called patient and left message verifying appointment for tomorrow and checking to see if patient needed to reschedule to keep the appointment the same.

## 2025-05-13 ENCOUNTER — APPOINTMENT (OUTPATIENT)
Dept: PHYSICAL MEDICINE AND REHAB | Facility: CLINIC | Age: 70
End: 2025-05-13
Payer: COMMERCIAL

## 2025-05-16 ENCOUNTER — CONSULT (OUTPATIENT)
Dept: PHYSICAL MEDICINE AND REHAB | Facility: CLINIC | Age: 70
End: 2025-05-16
Payer: COMMERCIAL

## 2025-05-16 VITALS — RESPIRATION RATE: 24 BRPM

## 2025-05-16 DIAGNOSIS — I87.8 VENOUS STASIS: ICD-10-CM

## 2025-05-16 DIAGNOSIS — M48.062 SPINAL STENOSIS OF LUMBAR REGION WITH NEUROGENIC CLAUDICATION: ICD-10-CM

## 2025-05-16 DIAGNOSIS — M79.89 LEG SWELLING: Primary | ICD-10-CM

## 2025-05-16 PROCEDURE — 99215 OFFICE O/P EST HI 40 MIN: CPT | Performed by: PHYSICAL MEDICINE & REHABILITATION

## 2025-05-16 ASSESSMENT — PAIN SCALES - GENERAL: PAINLEVEL_OUTOF10: 4

## 2025-05-16 NOTE — PROGRESS NOTES
Physical Medicine and Rehabilitation MSK Consult  05/16/25       Chief Complaint:  Low back pain leg swelling and pain     HPI:  Whitley Cohn is a  70 y.o. F  who presents to the clinic today  for evaluation of leg and swelling.    She is a pleasant 69 y.o. right handed woman with past medical history significant for  HTN, HLD, HTN, HLD, history of non-Hodgkin's lymphoma 2006 s/p chemo, anxiety, who presents for evaluation of low back pain.     Not interested in surgery.     Spasms move around.     Onset: 1.5 years ago, states had numbness in left foot  Location: tightnes, tingling in feet and lower legs, also swelling R>L  Shins feel tight and pressure  Radiation: as above  Quality: as above   Duration: comes and goes  Aggravating factors:  walking makes it better, sometimes prevents from falling asleep but sometimes wakes her up  Alleviating factors: feels has tightness from swelling         Treatment to date:  Physical therapy: Yes - many rund in the past  States she tried recent lymphedema therapy at [facility]  did not work out, did not feel a good touch  Tried chiropracticv and made things worse  Medications taken to date for this complaint include the following:   none  Prior injections: No       Tried compression garments not sure if it helped.     She saw spine surgeon in 2023 and was told that she likely doesn't need durgerty since mri from 2011 is stab.e    Started scrambler therapy but brother passed away in the midst of that and had to stop.  1/2 treatment, might have had relief.      Imaging  Mri ccf L spine 11/2023   MR lumbar spine wo IV contrast  Order: [id number]  Impression    IMPRESSION:    Lumbar spondylosis as described, worst at L4-5 with severe canal  narrowing.  Mild redundancy of cauda equina adjacent to the L4-5 level,  which may indicate significant stenosis.    Anatomic Lumbar Variant: None.  L4-5 is considered the level of the iliac  crest and assume there are 5 lumbar-type  vertebrae.    : YASSINE    Transcribe Date/Time: Nov 1 2023  7:57A    Dictated by : PATTIE BOURGEOIS MD    This examination was interpreted and the report reviewed and  electronically signed by:  PATTIE BOURGEOIS MD on Nov 1 2023  8:05AM  EST  Narrative    * * *Final Report* * *    DATE OF EXAM: Oct 31 2023  6:04PM      MATT   0303  -  MRI LUMBAR SPINE WO IVCON  / ACCESSION #  [id number]    PROCEDURE REASON: radiculopathy        * * * * Physician Interpretation * * * *     EXAMINATION:  MRI LUMBAR SPINE WO IVCON    CLINICAL HISTORY:  radiculopathy    TECHNIQUE: Routine lumbosacral spine MR protocol without gadolinium.    MQ:  [id number]    COMPARISON: Lumbar spine radiographs 10/13/2023      RESULT:      Counting reference:  Lumbosacral junction.  For the purposes of this  report,  L4-5 is considered the level of the iliac crest and assume there  are 5 lumbar-type vertebrae.  Anatomic variant:  None.    Localizer images:  No significant findings.    Alignment:    Levocurvature centered at L3.  Minimal retrolisthesis of  T12-L1, L1-L2, and grade 1 degenerative anterolisthesis of L4-5.  Grade 1  anterolisthesis L5-S1, thought to be degenerative, without definite  evidence for spondylolysis, consider CT for further evaluation if  clinically indicated.    Bone marrow signal/fracture: Type I degenerative change at T11-T12 and  T12-L1.  No evidence of pathologic marrow infiltration.  No evidence of  prior fracture.    Conus:  The conus is within normal limits of signal intensity and  morphology.  Mild redundancy of cauda equina adjacent to the L4-5 level,  which may indicate significant stenosis.    Paraspinal soft tissues:   Mild atrophy of the dorsal paraspinal  musculature.    T10-T11, T11-T12, and T12-L1:  On the sagittal images, canal is patent at  these levels, with mild diffuse disc bulges noted.  Foramina is patent at  T10-T11, and likely mildly narrowed at T11-T12, and moderately narrowed  at T12-L1 due to  facet arthropathy and disc bulge.    L1-L2:    Canal and foramina are patent.  Disc degeneration with mild  diffuse disc bulge noted.    L2-L3:    There is disc degeneration with diffuse disc bulge and facet  arthropathy causing mild canal narrowing without significant foraminal  stenosis.    L3-L4:    There is disc degeneration with diffuse disc bulge and facet  arthropathy causing moderate canal narrowing mild left and moderate right  foraminal stenosis.    L4-L5:    There is disc degeneration with diffuse disc bulge/pseudobulge  and facet arthropathy causing severe canal narrowing and moderate  bilateral foraminal stenosis.    L5-S1:    There is disc degeneration with diffuse disc bulge/pseudobulge  and facet arthropathy causing mild canal narrowing and moderate to severe  left foraminal stenosis.    Sacrum and iliac wings:   The visualized sacrum and iliac wings are  within normal limits.  Medical History[1]     Surgical History[2]     Patient Active Problem List    Diagnosis Date Noted    Chemotherapy-induced neuropathy (Multi) 11/18/2024    Spinal stenosis, lumbar region without neurogenic claudication 06/06/2024    Chronic low back pain 06/06/2024    Sore throat 04/04/2024    Spinal stenosis of lumbar region 12/08/2023    Degeneration of lumbar intervertebral disc 10/13/2023    Somatic dysfunction of lumbar region 10/06/2023    Lumbar radiculopathy 10/06/2023    Sacroiliac joint somatic dysfunction 10/06/2023    Somatic dysfunction of cervical region 10/06/2023    Cervicalgia 10/06/2023    Somatic dysfunction of thoracic region 10/06/2023    Plantar fasciitis, bilateral 10/06/2023    Myalgia, other site 10/06/2023    Anxiety and depression 10/02/2023    Aortic valve stenosis 10/02/2023    Atherosclerosis of aorta (CMS-HCC) 10/02/2023    Cardiac murmur 10/02/2023    Chronic cough 10/02/2023    Contusion of right foot 10/02/2023    Coronary artery calcification seen on CT scan 10/02/2023    Elevated blood  pressure reading 10/02/2023    Fever 10/02/2023    History of non-Hodgkin's lymphoma 10/02/2023    Immune deficiency disorder (Multi) 10/02/2023    Left ventricular outflow tract obstruction (HHS-HCC) 10/02/2023    MTHFR mutation (methylenetetrahydrofolate reductase) 10/02/2023    Muscle spasm 10/02/2023    Plantar fasciitis 10/02/2023    Pneumonia of left lower lobe due to infectious organism 10/02/2023    Psychophysiological insomnia 10/02/2023    Right hamstring injury 10/02/2023    Scoliosis 10/02/2023    Hearing loss due to cerumen impaction, right 10/02/2023    H/O: hypertension 09/27/2023    Overweight with body mass index (BMI) 25.0-29.9 09/27/2023    Overweight 09/27/2023    Plantar fascial fibromatosis 09/27/2023    Non-Hodgkin's lymphoma 07/14/2023    Anxiety disorder 07/14/2023    Recurrent pneumonia 04/04/2023    Thyroid nodule 11/06/2022    Iron deficiency anemia 11/05/2022    Leukopenia 11/05/2022    LVH (left ventricular hypertrophy) 11/05/2022    Nonrheumatic aortic valve stenosis 02/03/2020    Borderline hypertension 02/03/2020    Hemangioma of skin and subcutaneous tissue 07/08/2019    Melanocytic nevi of trunk 07/08/2019    Melanocytic nevi of other parts of face 07/08/2019    Nonscarring hair loss, unspecified 07/08/2019    Primary osteoarthritis of left knee 08/30/2017    Osteopenia 07/28/2017    Hypercholesteremia 11/23/2015    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   Eyes:  Denies change in visual acuity   HENT:  Denies nasal congestion or sore throat   Respiratory:  Denies cough or shortness of breath   Cardiovascular:  Denies chest pain or edema   GI:  Denies abdominal pain, nausea, vomiting, bloody stools or diarrhea    :  Denies dysuria   Integument:  Denies rash   Neurologic:  As per HPI  MSK: Per above HPI  Endocrine:  Denies polyuria or polydipsia   Lymphatic:  Denies swollen glands   Psychiatric:  Denies depression or anxiety            PHYSICAL EXAM:  Resp 24   LMP  (LMP Unknown)     General:  NAD, well developed, [unfilled]      Psychiatric: appropriate mood & affect.   Cardiovascular:  Normal pedal pulses; no LE edema.  Respiratory:  Normal rate; unlabored breathing.  Skin:  Intact; no erythema; no ecchymosis or rash.  Lymphatic:  No lymphadenopathy or lymphedema.  NEURO:  Alert and appropriate. Speech fluent, conversing appropriately.   Motor:    Rt: HF 5/5, KE 5/5, KF 5/5, DF 5/5, EHL 5/5, PF 5/5.    Lt: HF 5/5, KE 5/5, KF 5/5, DF 5/5, EHL 5/5, PF 5/5.  Grossly strength testing as above, but very sensitive to touch and exam so somewhat limited  1+ edema in ankles bl  Tenderness to palpation    Sensation:     Light touch: intact in the b/l L3-S1 dermatomes.  Reflexes:     Right:  patellar 2+, achilles 2+,     Left: patellar 2+, achilles 2+,     Babinski's downgoing b/l; no clonus  Gait: Normal, narrow based gait.             Impression: Whitley Cohn is a 70 y.o. F w pmh of chronic lower back pain and  severe spinal stenosis presents with lower leg soreness, swelling and tightness that is worse w prolonged walking. Most likely dx for the pain is spinal stenosis, neurogenic claudication, She has swelling likely due to venous insufficiency.      Plan:  Orders Placed This Encounter   Procedures    Referral to Physical Therapy    Referral to Physical Therapy      PT for myofascial release, manual therapy  Compression socks/knee highs; 20-30 mmhg  Lymphedema therapy for LE; mld, compression, education  Discussed testing w venous reflux studies but she is not interested at this time, wants to try lymph therapy and regular compression garments   5. Suggest EMG for LE to eval pn vs radic, but she is not interested at this  time  6. She is not interested in any medications for pain, but open to supplements. Discussed can try turmeric which has already been recommended.  7. She is interested in seeing a neuromuscular physical therapist; its not clear what exactly she means; she will see of the therapist has particular certification that I can see if any of the  therapists have.    fu prn    Thank you for the consultation.     Sima Osborne MD  Physical Medicine and Rehabilitation          [1]   Past Medical History:  Diagnosis Date    Asymptomatic menopausal state     Menopause    Unspecified tear of unspecified meniscus, current injury, left knee, initial encounter 09/13/2017    Tear of meniscus of left knee   [2] No past surgical history on file.  [3]   Family History  Problem Relation Name Age of Onset    Aplastic anemia Mother      Depression Mother      Diabetes Brother     [4]   Current Outpatient Medications   Medication Sig Dispense Refill    ascorbic acid (Vitamin C) 500 mg tablet Take 1 tablet (500 mg) by mouth once daily.      biotin 5,000 mcg tablet,disintegrating Take by mouth.      cholecalciferol (Vitamin D-3) 125 mcg (5000 UT) capsule Take 1 capsule (125 mcg) by mouth once daily.      collagen, hydrolysate, bovine, (COLLAGEN, HYDR, BOVINE,, BULK, MISC) 1 Scoop once daily.      gelatin, bulk, powder 1 Scoop once daily.      glucosamine-chondroitin 500-400 mg tablet Take 1 tablet by mouth 3 times a day.      lactobacillus acidophilus (Lactobacillus acidoph-L.bulgar) tablet tablet Take by mouth.      levomefolate calcium (L-Methylfolate) 15 mg tablet Take by mouth once daily.      levomefolate/B6/B12/algal oil (METANX, ALGAL OIL, ORAL) Take 1 capsule by mouth once daily.      LUTEIN-ZEAXANTHIN ORAL Take 1 tablet by mouth once daily.      MAGNESIUM CITRATE ORAL Take by mouth.      multivitamin with minerals (Adults Multivitamin) tablet Take 1 tablet by mouth once daily.      omega 3-dha-epa-fish oil (Fish  OiL) 1,000 mg (120 mg-180 mg) capsule Take 1 capsule (1,000 mg) by mouth once daily.      plant stanol shelley (Cholest Off Plus) 450 mg capsule Take by mouth once daily.      RESVERATROL ORAL Take by mouth once daily.      VITAMIN B COMPLEX ORAL Take 1 tablet by mouth once daily.      ZINC ORAL Take 50 mg by mouth once daily.      amoxicillin (Amoxil) 500 mg capsule Take 1 capsule (500 mg) by mouth 3 times a day. (Patient not taking: Reported on 5/16/2025)      furosemide (Lasix) 20 mg tablet TAKE 1 TABLET (20 MG) BY MOUTH ONCE DAILY AS NEEDED (SWELLING). (Patient not taking: Reported on 5/16/2025) 90 tablet 3     No current facility-administered medications for this visit.   [5]   Allergies  Allergen Reactions    Pneumococcal Vaccine Fever     Fever to 102    Albuterol Palpitations     Increased heart rate and nervousness.    Azithromycin Other     Cardiac arrhthymias    Ciprofloxacin Unknown    Levofloxacin Unknown    Nsaids (Non-Steroidal Anti-Inflammatory Drug) GI Upset and Unknown   [6]   Social History  Socioeconomic History    Marital status:    Tobacco Use    Smoking status: Never    Smokeless tobacco: Never   Vaping Use    Vaping status: Never Used   Substance and Sexual Activity    Alcohol use: Not Currently     Comment: very rare but not in 45 years    Drug use: Never     Social Drivers of Health     Financial Resource Strain: Patient Declined (5/21/2024)    Received from [facility]    Overall Financial Resource Strain (CARDIA)     Difficulty of Paying Living Expenses: Patient declined   Food Insecurity: Patient Declined (5/21/2024)    Received from [facility]    Hunger Vital Sign     Worried About Running Out of Food in the Last Year: Patient declined     Ran Out of Food in the Last Year: Patient declined   Transportation Needs: Unmet Transportation Needs (5/21/2024)    Received from [facility]    PRAPARE - Transportation     Lack of Transportation (Medical): Yes     Lack of  Transportation (Non-Medical): Yes   Physical Activity: Sufficiently Active (5/21/2024)    Received from [facility]    Exercise Vital Sign     Days of Exercise per Week: 7 days     Minutes of Exercise per Session: 30 min   Stress: Stress Concern Present (5/21/2024)    Received from [facility]    [employer] [place] [employer] - Occupational Stress Questionnaire     Feeling of Stress : To some extent   Social Connections: Socially Integrated (5/21/2024)    Received from [facility]    Social Connection and Isolation Panel [NHANES]     Frequency of Communication with Friends and Family: More than three times a week     Frequency of Social Gatherings with Friends and Family: More than three times a week     Attends Jainism Services: More than 4 times per year     Active Member of Clubs or Organizations: Yes     Attends Club or Organization Meetings: More than 4 times per year     Marital Status:    Intimate Partner Violence: Low Risk  (1/30/2025)    Received from [facility] ([facility])    Interpersonal Safety Assessment ED Visit     Signs/Symptoms of Abuse or Neglect?: No     Has anyone physically harmed you in the last 12 months?: No     Do you feel safe at home?: Yes   Housing Stability: Patient Declined (5/21/2024)    Received from [facility]    Housing Stability Vital Sign     Unable to Pay for Housing in the Last Year: Patient declined     Unstable Housing in the Last Year: Patient declined

## 2025-05-16 NOTE — PROGRESS NOTES
Ref bt Dr. Ly for pain to lower legs and feet.  Also lower back pain that extends down the patient right leg also.

## 2025-05-22 ENCOUNTER — APPOINTMENT (OUTPATIENT)
Dept: INTEGRATIVE MEDICINE | Facility: CLINIC | Age: 70
End: 2025-05-22
Payer: COMMERCIAL

## 2025-06-05 ENCOUNTER — APPOINTMENT (OUTPATIENT)
Dept: INTEGRATIVE MEDICINE | Facility: CLINIC | Age: 70
End: 2025-06-05
Payer: COMMERCIAL

## 2025-06-05 PROCEDURE — MASSCHA MASSAGE-CHAIR: Performed by: MASSAGE THERAPIST

## 2025-06-05 NOTE — PROGRESS NOTES
Integrative Oncology Reiki Visit 7 (patient cancelled 05.22 session)    Session Start   Date: 06.05.2025  Time: 0945 (previous session ran over: advised patient session would start later and she agreed)    Session End   Date: 06.05.2025  Time: 1030    Assessment of patient:  Identified patient via full name and date of birth  Arrived ambulatory, steady gait, no assistive device   Noted benefits from last session: more relaxed, less symptoms, felt better  Feels a tightness in the lower legs and feet and that causes a challenge with walking and this can promote anxiety  Looking for the right PT therapy  Asked about neuromuscular therapy and if it was offered at [facility] (recommendation listed below)  She will continue Reiki  She does not do well with Acupuncture, as she responds well to hands on therapy  Intermittent spasm in upper right thigh; sometimes on the left side: Neuromuscular Therapy helped in the past, after a foot injury    Self-care Noted in Previous Session:  Patient confirmed she is using compression hose, elevates lower extremities, pumps ankles to promote circulation, using epsom salts in a small quantity in a foot soak or soaking feet, ankles, and lower extremity in the tub; drinks Dandelion Tea, and watches water intake/low salt use, hydrates, exercises at fitness center with focus on cardio and weights; water walking twice a week; yoga once per week  Attending OT appointments for lymphedema support at outside health system  Receives massage at an outside health system    WellbeingPre 5  CopingPre 5  Pain Pre 5  TirednessPre 5  AnxietyPre 5  DepressionPre 5  StressPre 5  NauseaPre 5    Session goals:   To promote relaxation, comfort, wellbeing, relief from pain, tiredness, anxiety, stress, and nausea    Patient Intention:   Healing     Provider Intention:   Patient's highest and greatest healing and wellbeing     Reiki procedure    Hand positions:   Supine Only:   Hands On: head: frontal, temporal,  occiput, posterior neck, anterior and posterior shoulders, hands, mid-back, lower back, lateral hips, ankles, dorsal and planter surfaces of feet  Hands Off/Hovering: anterior chest abdomen, pelvic region     Duration of session (minutes): 35    Hands on or hands off  [] hands on only  [] hands off only  [x] both     Were you interrupted during the session? No    Was music used during the session? Yes    What music recording was used?   [employer]: Spa Machine    Did the patient talk with you during the Reiki session? Yes  During part of the session    Did the patient fall asleep during the Reiki session? No    Do you have any comments about the session, such as the patient's reaction, or your experience during the Reiki?   Patient rested with eyes closed, relaxed facial and body posture, and deep, even breathing     Do you feel that the patient benefited from the treatment today? Yes  She stated she was somewhat relaxed  Feet still feel really tight  Ratings did not change  Recommended scheduling with Codey Calix LMT (Dean) for Neuromuscular Therapy    WellbeingPost 5  CopingPost 5  PainPost 5  TirednessPost 5  AnxietyPost 5  DepressionPost 5  StressPost 5  NauseaPost 5    Scheduled for:  Integrative Oncology Symptom Management [facility]: Massage Therapy: none scheduled  Integrative Oncology Reiki: 06.26, 07.10, 07.31, 08.21  Integrative Oncology Consult: Dr. Hudson Ly: 06.09  Chiropractic: Dr. Nannette Boyd: none scheduled    Note signed by Nancy Menendez LMT on 06/05/25

## 2025-06-09 ENCOUNTER — EVALUATION (OUTPATIENT)
Dept: PHYSICAL THERAPY | Facility: HOSPITAL | Age: 70
End: 2025-06-09
Payer: COMMERCIAL

## 2025-06-09 ENCOUNTER — APPOINTMENT (OUTPATIENT)
Dept: INTEGRATIVE MEDICINE | Facility: CLINIC | Age: 70
End: 2025-06-09
Payer: COMMERCIAL

## 2025-06-09 DIAGNOSIS — M79.18 MYALGIA, OTHER SITE: ICD-10-CM

## 2025-06-09 DIAGNOSIS — M48.062 SPINAL STENOSIS OF LUMBAR REGION WITH NEUROGENIC CLAUDICATION: ICD-10-CM

## 2025-06-09 DIAGNOSIS — M99.04 SACROILIAC JOINT SOMATIC DYSFUNCTION: Primary | ICD-10-CM

## 2025-06-09 DIAGNOSIS — R60.0 EDEMA LEG: Primary | ICD-10-CM

## 2025-06-09 DIAGNOSIS — M54.16 LUMBAR RADICULOPATHY: ICD-10-CM

## 2025-06-09 PROCEDURE — 97140 MANUAL THERAPY 1/> REGIONS: CPT | Performed by: HOSPITALIST

## 2025-06-09 PROCEDURE — 97535 SELF CARE MNGMENT TRAINING: CPT | Mod: GP | Performed by: PHYSICAL THERAPIST

## 2025-06-09 PROCEDURE — 97140 MANUAL THERAPY 1/> REGIONS: CPT | Mod: GP | Performed by: PHYSICAL THERAPIST

## 2025-06-09 PROCEDURE — 97162 PT EVAL MOD COMPLEX 30 MIN: CPT | Mod: GP | Performed by: PHYSICAL THERAPIST

## 2025-06-09 ASSESSMENT — ENCOUNTER SYMPTOMS
DEPRESSION: 0
OCCASIONAL FEELINGS OF UNSTEADINESS: 0
LOSS OF SENSATION IN FEET: 1

## 2025-06-09 NOTE — PROGRESS NOTES
Manual Therapy Visit:     Whitley Cohn was referred by Hudson Ly M.D.    Condition of Client Subjective:    Patient ID: Whitley is a 70 y.o. female who presents for reason for visit of myalgia.    HPI: non-Hodgkins lymphoma    Session Information  Visit Type: manual therapy  Description of present complaint: myalgia    Review of Systems    Objective   Pre-treatment Assessment  Arrival Mode: Ambulatory    Wellbeing Level (0-10): 6  Coping Level (0-10): 6  Pain Score (0-10)5  Fatigue Level (0-10): 5  Anxiety Level (0-10): 5  Depression Level (0-10): 5  Stress Level (0-10): 5  Nausea Level (0-10): 5    Actions Assessment/Plan :  Provider reviewed plan for the manual therapy session, precautions and contraindications. Patient has given consent to treat with full understanding of what to expect during the session. Before manual therapy began, provider explained to the patient to communicate at any time if the pressure was causing discomfort past their tolerance level. Patient agreed to advise therapist.    Treatment:  MANUAL THERAPY 38 minutes    Patient Position: Table, Supine, prone  Positioning Assistance: Did not need assistance  Massage Technique: Other (specify) (Manual therapy)  Area/Body Region: upper and lower body  Pressure Scale: 1 - Light pressure, 2 - Mild pressure, 3 - Medium pressure    Response:  Post-treatment Assessment  Wellbeing Level (0-10): 7  Coping Level (0-10): 7  Pain Score (0-10)3  Fatigue Level (0-10): 3  Anxiety Level (0-10): 4  Depression Level (0-10): 4  Stress Level (0-10): 4  Nausea Level (0-10): 2

## 2025-06-09 NOTE — PROGRESS NOTES
Physical Therapy Evaluation and Treatment      Patient Name:Whitley Cohn   MRN:[id number]   Today's Date:2025   Referred by: Sima Elliott                   Insurance  Visit number: 1   Approved number of visits: CARLOS Don required: No  Authorization date range:   Onset Date: 2025  Medicare Certification Period:  Beginnin/9/2025            Endin25  Payor: [facility] / Plan: [facility] / Product Type: *No Product type* /     Assessment:   69 yo female with PMH for non hodgkin's lymphoma 2006 with chemo, hx of plantar fasciitis R side, heart murmur, anemia, states past treatment for R plantar fasciitis with boot and crutches tends to lead to R side back pain, however, here today for concerns of R foot>L swelling, insidious onset for 1 year duration, displays increased circumference mostly R foot and ankle >L, pitting, otherwise good LE contour, no other skin changes, unclear of etiology of swelling, at this time recommend pt to try elevation and compression (tubigrip provided to pt today size C and D), continue her current exercises, details reviewed and all questions answered, PT will focus on further compression needs assessments, MLD prn to help with swelling control, recommend additional compression garment as ready. Pt verbalized understanding.       Rehab Potential: good  Clinical Presentation: Evolving with changing characteristics   Evaluation Complexity: moderate      Plan:   Visit number: 1   PT Plan: Skilled PT  PT Frequency: 2 times per week  Duration: 10 weeks  Onset Date: 25  Certification Period Start Date: 25  Certification Period End Date: 25  Number of Treatments Authorized: [facility] CARLOS BEDOLLA  Rehab Potential: Good  Plan of Care Agreement: Patient    Planned interventions include: assess effect of tubigrip, add short stretch, can add MLD, continue effort with elevation, continue her current exercises, recommend new  compression garment when ready.   Frequency and duration: 2x/week 10 weeks   Plan of care was developed with input and agreement by the patient.     Precautions/Fall Risk:  Fall Risk: Low based on professional judgment  Pacemaker: no    Seizures: No    Post Op Movement/Restrictions: No:  Weight Bearing Status: As tolerated  Other Medical precautions:    Therapy Diagnosis:  Problem List Items Addressed This Visit           ICD-10-CM       Neuro    Spinal stenosis of lumbar region M48.061       Symptoms and Signs    Edema leg - Primary R60.0    Relevant Orders    Follow Up In Physical Therapy       Current Problem:   1. Edema leg  Follow Up In Physical Therapy      2. Spinal stenosis of lumbar region with neurogenic claudication  Referral to Physical Therapy                Subjective:  General:  General  Reason for Referral: M79.89 (ICD-10-CM) - Leg swelling  I87.8 (ICD-10-CM) - Venous stasis  Referred By: Sima Vu MD  Past Medical History Relevant to Rehab: non hodgkin's lymphoma 2006 with chemo, hx of plantar fasciitis R side, heart murmur, anemia.  Preferred Learning Style: verbal, visual, written  Precautions:  Precautions  STEADI Fall Risk Score (The score of 4 or more indicates an increased risk of falling): 1    HPI: pt was referred to PT by PMR for concerns of chronic spinal stenosis and B LE swelling.  Pt states swelling R>L over a year ago, was having hx of  plantar fasciitis and LE numbness and tingling, was having muscle spasms, feels it made her LE and lower back out of alignment, 12 years ago was seen by a therapist which helped with relieve her pain, last one was Oct 2023 with boot and crutches,  ,  but overall a year ago with increased swelling R lower leg and feet.     Pt states hx of non-hodgkin's lymphoma 2006 s/p chemo, ongoing LBP and spasm on lower back.  Was dx with spinal stenosis and recent MRI with no progressing of stenosis, hx of some scoliosis.    Regarding to her  swelling, mostly on R foot and lower leg, some fluctuating, but not able to pinpoint the time/position/weather changes.  Sleeping in regular bed, body weight no significant change.    No previous treatment for swelling.  Tried elevating at home, some compression socks in the past (not sure brand, bought from [employer] and not using regularly).         PMH: non hodgkin's lymphoma 2006 with chemo, hx of plantar fasciitis R side, heart murmur, anemia.      MEDICATIONS:none for pain meds.      SOCIAL HX/JOB STATUS: teacher prn, active life style, independent with ADLs, caring for grandchildren.      BASELINE FUNCTION:  exercises with weight, cardio 4x/week, water walking, decompression in water, yoga 1x/week.  Walking outside in the past (not recently due to R foot).      Pain:  Location of Pain: R LE feels tingling, tight, padded on bottom of foot R>L.   Some LBP (not addressed today due to time constraint).     Current Pain Level (0-10): 6   High Pain Level (0-10): 9   Low Pain Level (0-10): 3  Pain Exacerbating Factors: walking uncomfortable feels numb,   Pain Relieving Factors: unsure.      Patient Awareness: Patient is aware of  her diagnosis and prognosis.       Objective   LE circumference measure R/L:   Date/R/L 6/9/25       Metatarsal 22.6/22.8       Arch  22.8/22.5       Malleoli 23.5/24.0       ankle 21.0/21.0       10cm above ankle 25.8/24.6       20cm above ankle 35.5/34.3       calf 36.4/35.8       Tibial tuberosity  32.0/32.0       knee        10cm above knee        20cm above knee        30cm above knee        Gluteal fold        Length (knee/thigh)          ROM: WNL B LE  Hip:  knee:  Ankle:    Functional mobility: WNL B LE able to SLS 5-7 sec each side, limited by feeling of numbness and padding sensation along B plantar foot       Skin appearance/condition:   Pitting along R>L dorsal foot and ankle, neg hemosiderin staining,  neg stemmer's sign. Neg other skin changes.       LLIS 70%      Outcome  Measures:  Other Measures  Lymphedema Life Impact Scale (LLIS): 49     Treatments:     Treatment Performed Today:   Treatment provided today:  Educated patient on lymphedema etiology, treatment rationale and expectations. All questions answered.  Reviewed with pt current exercise routine including stationary bike, light weight training, walking, pool walking, yoga all can continue  Recommend elevation B LE when resting, details reviewed  Provided size C tubigrip for L foot and ankle, size D tubigrip for R foot and ankle, donned, expectations and rationale explained to pt, can remove if not able to tolerate, otherwise will continue until next visit. Recommend to wear tennis shoes for further compression.   Pt will bring loose shoes (will email her recommendations of shoes) to allow compression bandaging to B  foot.         Response to Treatment: improved knowledge and understanding of condition    Education/Resources provided today: Home Program   [employer]:    OP EDUCATION:  Outpatient Education  Individual(s) Educated: Patient  Education Provided: Home Exercise Program, POC  Patient/Caregiver Demonstrated Understanding: yes  Plan of Care Discussed and Agreed Upon: yes  Patient Response to Education: Patient/Caregiver Verbalized Understanding of Information    Goals:  Patient's Goal for Treatment: Relieve pain, Reduce symptoms, and Return to prior level of function    To be met at time of discharge:  --Decrease girth measurement of UE/LE by 2cm or until plateau.  -- Improve skin/tissue no longer has pitting R>L foot and ankle.   -- Knowledgeable and compliant with home program, including lymphedema management and exercises.   -- Improve functional outcome on LLIS to <40%

## 2025-06-11 ENCOUNTER — TREATMENT (OUTPATIENT)
Dept: PHYSICAL THERAPY | Facility: HOSPITAL | Age: 70
End: 2025-06-11
Payer: COMMERCIAL

## 2025-06-11 ENCOUNTER — APPOINTMENT (OUTPATIENT)
Dept: PHYSICAL THERAPY | Facility: HOSPITAL | Age: 70
End: 2025-06-11
Payer: COMMERCIAL

## 2025-06-11 DIAGNOSIS — R60.0 EDEMA LEG: Primary | ICD-10-CM

## 2025-06-11 PROCEDURE — 97140 MANUAL THERAPY 1/> REGIONS: CPT | Mod: GP | Performed by: PHYSICAL THERAPIST

## 2025-06-11 PROCEDURE — 97110 THERAPEUTIC EXERCISES: CPT | Mod: GP | Performed by: PHYSICAL THERAPIST

## 2025-06-11 NOTE — PROGRESS NOTES
Physical Therapy Treatment    Patient Name:Whitley Cohn   MRN:[id number]   Today's Date:2025   Referred by: Sima Elliott   Time Calculation  Start Time: 1019  Stop Time: 1124  Time Calculation (min): 65 min       PT Therapeutic Procedures Time Entry  Therapeutic Exercise Time Entry: 15  Manual Therapy Time Entry: 50       Insurance  Visit number: 2   Approved number of visits: [place]  Auth required: No  Authorization date range:   Onset Date: 2025  Medicare Certification Period:  Beginnin/9/2025            Endin25     Payor: [facility] / Plan: [facility] / Product Type: *No Product type* /     Assessment:  69 yo female with PMH for non hodgkin's lymphoma 2006 with chemo, hx of plantar fasciitis R side, heart murmur, anemia, states past treatment for R plantar fasciitis with boot and crutches tends to lead to R side back pain, however, here today for concerns of R foot>L swelling, insidious onset for 1 year duration, displays increased circumference mostly R foot and ankle >L, pitting, otherwise good LE contour, no other skin changes, unclear of etiology of swelling, at this time recommend pt to try elevation and compression (tubigrip provided to pt today size C and D), continue her current exercises, details reviewed and all questions answered, PT will focus on further compression needs assessments, MLD prn to help with swelling control, recommend additional compression garment as ready.    25: pt is showing good improvement with current effort of elevation and compression with tubigrip, only residual R lateral foot swelling today, added MLD today, updated remedial exercises, changed tubigrip R side to size C with addition of komprex II for R lateral foot, all to based on tolerance, details reviewed.  Pt also requested options of knee high compression socks, information provided for medi duomed advandage (small, standard 15-20mmHg) or [place]  comfort (size 2, standard 15-20mmHg).        Plan:  Recheck at next appt, expect to do well and can transition to back pain evaluation if able.        Precautions/Fall Risk:  Fall Risk: Low based on professional judgment  Pacemaker: no    Seizures: No    Post Op Movement/Restrictions: No:  Weight Bearing Status: As tolerated  Other Medical precautions:      OP PT Plan  PT Plan: Skilled PT  PT Frequency: 2 times per week  Duration: 10 weeks  Onset Date: 05/16/25  Certification Period Start Date: 06/09/25  Certification Period End Date: 09/07/25  Number of Treatments Authorized: [facility] [place], [place]  Rehab Potential: Good  Plan of Care Agreement: Patient    Therapy Diagnosis   Problem List Items Addressed This Visit           ICD-10-CM       Symptoms and Signs    Edema leg - Primary R60.0       Current Problem  1. Edema leg  Follow Up In Physical Therapy              Subjective/Pain:  Current Pain Level (0-10): 0  Pt states she has been using her tubigrip daily and at night last night, has been elevating her legs.     HEP Compliance: Good    General:  General  Reason for Referral: M79.89 (ICD-10-CM) - Leg swelling  I87.8 (ICD-10-CM) - Venous stasis  Referred By: Sima Vu MD  Past Medical History Relevant to Rehab: non hodgkin's lymphoma 2006 with chemo, hx of plantar fasciitis R side, heart murmur, anemia.  Preferred Learning Style: verbal, visual, written  Precautions:       Objective/Outcome Measures:  LE circumference measure R/L:   Date/R/L 6/9/25 6/11/25         Metatarsal 22.6/22.8  21.4/22.0         Arch  22.8/22.5  22.2/21.2         Malleoli 23.5/24.0  22.8/22.6         ankle 21.0/21.0  20.5/20.1         10cm above ankle 25.8/24.6  25.4/24.6         20cm above ankle 35.5/34.3  34.8/34.3         calf 36.4/35.8  36.0/34.8         Tibial tuberosity  32.0/32.0  32.5/32.8         knee             10cm above knee             20cm above knee             30cm above knee             Gluteal fold              Length (knee/thigh)                      Functional mobility: WNL B LE able to SLS 5-7 sec each side, limited by feeling of numbness and padding sensation along B plantar foot       Skin appearance/condition:   Much improved  circumference B, residual light pitting R dorsal lateral foot only, good bony prominence and leg contour otherwise.      neg hemosiderin staining,  neg stemmer's sign. Neg other skin changes.        Outcome Measures:       Treatments:     PT Therapeutic Procedures Time Entry  Therapeutic Exercise Time Entry: 15  Manual Therapy Time Entry: 50 minutes  Time Calculation  Start Time: 1019  Stop Time: 1124  Time Calculation (min): 65 min       PT Therapeutic Procedures Time Entry  Therapeutic Exercise Time Entry: 15  Manual Therapy Time Entry: 50     Therapeutic exercises:   Reviewed with pt continued effort of elevation, instructed in remedial exercises,. Handout issued     Manual Therapy 50 minutes  Measurement  MLD abdomen I and II  MLD B inguinal node  MLD B LE ant and post  Repeated size C tubigrip B, added a komprex II piece for R lateral foot for additional compression, all to comfort and can remove or adjust as needed    Provided pt information to obtain knee high 15-20mmHg recommend medi duomed advantage or medieven comfort.      Education/Resources provided today: Home Program   [employer]:  Access Code: [email or web address]  URL: [email or web address]  Date: 06/11/2025  Prepared by: Bernie Valdes    Exercises  - Supine Diaphragmatic Breathing  - 2 x daily - 5 x weekly - 1 sets - 5 reps  - Seated Diaphragmatic Breathing  - 2 x daily - 5 x weekly - 1 sets - 5 reps  - Ankle Pumps with Compression Garment  - 2 x daily - 5 x weekly - 1 sets - 5 reps  - Supine Heel Slide  - 2 x daily - 5 x weekly - 1 sets - 5 reps  - Supine Butterfly Groin Stretch  - 2 x daily - 5 x weekly - 1 sets - 4 reps  - Seated Long Arc Quad  - 2 x daily - 5 x weekly - 1 sets - 5 reps  - Standing Heel Raises  - 2  x daily - 5 x weekly - 1 sets - 5 reps  - Squat with Chair Support  - 2 x daily - 5 x weekly - 1 sets - 5 reps    OP EDUCATION:  Outpatient Education  Individual(s) Educated: Patient  Education Provided: Home Exercise Program, POC  Patient/Caregiver Demonstrated Understanding: yes  Plan of Care Discussed and Agreed Upon: yes  Patient Response to Education: Patient/Caregiver Verbalized Understanding of Information    Goals:  Patient's Goal for Treatment: Relieve pain, Reduce symptoms, and Return to prior level of function     To be met at time of discharge:  --Decrease girth measurement of UE/LE by 2cm or until plateau.  -- Improve skin/tissue no longer has pitting R>L foot and ankle.   -- Knowledgeable and compliant with home program, including lymphedema management and exercises.   -- Improve functional outcome on LLIS to <40%

## 2025-06-25 ENCOUNTER — APPOINTMENT (OUTPATIENT)
Dept: PHYSICAL THERAPY | Facility: HOSPITAL | Age: 70
End: 2025-06-25
Payer: COMMERCIAL

## 2025-06-26 ENCOUNTER — APPOINTMENT (OUTPATIENT)
Dept: INTEGRATIVE MEDICINE | Facility: CLINIC | Age: 70
End: 2025-06-26
Payer: COMMERCIAL

## 2025-06-26 PROCEDURE — MASSCHA MASSAGE-CHAIR: Performed by: MASSAGE THERAPIST

## 2025-06-26 NOTE — PROGRESS NOTES
Integrative Oncology Reiki Visit 8    Session Start   Date: 06.26.2025  Time: 1015    Session End   Date: 06.26.2025  Time: 1100    Assessment of patient:  Identified patient via full name and date of birth  Arrived ambulatory, steady gait, no assistive device   Stress: physical symptoms, life stresses  Pain: constant tingling and stiffness in the ankles and feet, feels uncomfortable and leads to stress  She likes to walk for exercise and stress relief; but the tingling is uncomfortable when walking  She would like to get better, but not there yet  She is trying to keep up with exercise and make the right decisions healthwise  She wants to have tools to promote a healthy lifestyle    Self-care Noted in Previous Session:  Patient confirmed she is using compression hose, elevates lower extremities, pumps ankles to promote circulation, using epsom salts in a small quantity in a foot soak or soaking feet, ankles, and lower extremity in the tub; drinks Dandelion Tea, and watches water intake/low salt use, hydrates, exercises at fitness center with focus on cardio and weights; water walking twice a week; yoga once per week  Attending OT appointments for lymphedema support at outside health system  Receives massage at an outside health system    Information Visit 7, 06.05.2025:   Does not respond well to Acupuncture  Neuromuscular Therapy was effective in the past (referral to Salvador Calix LMT)  Looking for the right Physical Therapist/Therapy  Will continue Reiki    WellbeingPre 5  CopingPre 5  Pain Pre 5  TirednessPre 5  AnxietyPre 5  DepressionPre 5  StressPre 5  NauseaPre 5    Session goals:   To promote relaxation, comfort, wellbeing, relief from pain, tiredness, anxiety, stress, and nausea    Patient Intention:   Getting well and relaxing    Provider Intention:   Patient's highest and greatest healing and wellbeing     Reiki procedure    Hand positions:   Supine Only:   Hands On: head: frontal, temporal, occiput,  posterior neck, anterior and posterior shoulders, hands, mid-back, lower back, lateral hips, ankles, dorsal and planter surfaces of feet  Hands Off/Hovering: anterior neck and chest    Duration of session (minutes): 35 minutes Reiki/45 minute session    Hands on or hands off  [] hands on only  [] hands off only  [x] both     Were you interrupted during the session? No    Was music used during the session? Yes    What music recording was used?   [employer]: Spa Machine    Did the patient talk with you during the Reiki session? Yes  During part of the session    Did the patient fall asleep during the Reiki session? No    Do you have any comments about the session, such as the patient's reaction, or your experience during the Reiki?   Patient rested with eyes closed, relaxed facial and body posture, and deep, even breathing     Do you feel that the patient benefited from the treatment today? Yes   Feeling a little better, more relaxed  Wellbeing, coping, pain, tiredness, anxiety, depression, stress, and nausea ratings improved    WellbeingPost 6  CopingPost 6  PainPost 4  TirednessPost 4  AnxietyPost 4  DepressionPost 4  StressPost 4  NauseaPost 4    Scheduled for:  Integrative Oncology Symptom Management Clinic: Massage Therapy: none scheduled  Integrative Oncology Reiki: 07.10, 07.31, 08.21  Integrative Oncology Consult: Dr. Hudson Ly: none scheduled  Chiropractic: Dr. Nannette Boyd: none scheduled    Note signed by Nancy Menendez LMT on 06/27/25

## 2025-06-27 ENCOUNTER — APPOINTMENT (OUTPATIENT)
Dept: PHYSICAL THERAPY | Facility: HOSPITAL | Age: 70
End: 2025-06-27
Payer: COMMERCIAL

## 2025-07-10 ENCOUNTER — OFFICE VISIT (OUTPATIENT)
Facility: CLINIC | Age: 70
End: 2025-07-10
Payer: COMMERCIAL

## 2025-07-10 ENCOUNTER — APPOINTMENT (OUTPATIENT)
Dept: INTEGRATIVE MEDICINE | Facility: CLINIC | Age: 70
End: 2025-07-10
Payer: COMMERCIAL

## 2025-07-10 VITALS
HEIGHT: 65 IN | BODY MASS INDEX: 27.49 KG/M2 | DIASTOLIC BLOOD PRESSURE: 62 MMHG | WEIGHT: 165 LBS | SYSTOLIC BLOOD PRESSURE: 120 MMHG | RESPIRATION RATE: 16 BRPM

## 2025-07-10 DIAGNOSIS — T45.1X5A CHEMOTHERAPY-INDUCED NEUROPATHY (MULTI): Primary | ICD-10-CM

## 2025-07-10 DIAGNOSIS — M48.061 SPINAL STENOSIS, LUMBAR REGION WITHOUT NEUROGENIC CLAUDICATION: ICD-10-CM

## 2025-07-10 DIAGNOSIS — G62.0 CHEMOTHERAPY-INDUCED NEUROPATHY (MULTI): Primary | ICD-10-CM

## 2025-07-10 DIAGNOSIS — G89.29 OTHER CHRONIC PAIN: ICD-10-CM

## 2025-07-10 PROCEDURE — 99214 OFFICE O/P EST MOD 30 MIN: CPT | Performed by: ANESTHESIOLOGY

## 2025-07-10 PROCEDURE — 1125F AMNT PAIN NOTED PAIN PRSNT: CPT | Performed by: ANESTHESIOLOGY

## 2025-07-10 PROCEDURE — 3008F BODY MASS INDEX DOCD: CPT | Performed by: ANESTHESIOLOGY

## 2025-07-10 PROCEDURE — 1159F MED LIST DOCD IN RCRD: CPT | Performed by: ANESTHESIOLOGY

## 2025-07-10 PROCEDURE — 1160F RVW MEDS BY RX/DR IN RCRD: CPT | Performed by: ANESTHESIOLOGY

## 2025-07-10 PROCEDURE — 1036F TOBACCO NON-USER: CPT | Performed by: ANESTHESIOLOGY

## 2025-07-10 RX ORDER — ALCOHOL 2.38 KG/3.79L
1 GEL TOPICAL 2 TIMES DAILY
Qty: 90 CAPSULE | Refills: 2 | Status: SHIPPED | OUTPATIENT
Start: 2025-07-10 | End: 2025-08-09

## 2025-07-10 ASSESSMENT — PAIN - FUNCTIONAL ASSESSMENT: PAIN_FUNCTIONAL_ASSESSMENT: 0-10

## 2025-07-10 ASSESSMENT — ENCOUNTER SYMPTOMS
GASTROINTESTINAL NEGATIVE: 1
RESPIRATORY NEGATIVE: 1
MUSCULOSKELETAL NEGATIVE: 1
CARDIOVASCULAR NEGATIVE: 1
ENDOCRINE NEGATIVE: 1
EYES NEGATIVE: 1
CONSTITUTIONAL NEGATIVE: 1
PSYCHIATRIC NEGATIVE: 1
HEMATOLOGIC/LYMPHATIC NEGATIVE: 1
NUMBNESS: 1
ALLERGIC/IMMUNOLOGIC NEGATIVE: 1

## 2025-07-10 ASSESSMENT — PAIN SCALES - GENERAL
PAINLEVEL_OUTOF10: 6
PAINLEVEL_OUTOF10: 6

## 2025-07-10 NOTE — PROGRESS NOTES
Subjective   Patient ID: Whitley Cohn is a 70 y.o. female who presents for Peripheral Neuropathy and Foot Pain.  Neuropathy        Patient here today for follow up today.  She wants to discussed her treatment plan.  She is scheduled to restart Scrambler therapy again.  She had some questions about the flares in pain she is having.      She find that her her feet and shins were flaring and she had to take a tramadol recently which she doesn't like to do.  She does like the Mentanx and wanted to know about how to take this with her other supplements.  She is asking to see if there are any options for her acut epain when it does flare.     She wants to move forward to her healthy life style and natural treatments.  She also has been having PEMP treatment which seems to help as well.                Review of Systems   Constitutional: Negative.    HENT: Negative.     Eyes: Negative.    Respiratory: Negative.     Cardiovascular: Negative.    Gastrointestinal: Negative.    Endocrine: Negative.    Genitourinary: Negative.    Musculoskeletal: Negative.    Skin: Negative.    Allergic/Immunologic: Negative.    Neurological:  Positive for numbness.   Hematological: Negative.    Psychiatric/Behavioral: Negative.         Objective   Physical Exam  Vitals and nursing note reviewed.   Constitutional:       Appearance: Normal appearance.   HENT:      Head: Normocephalic and atraumatic.      Right Ear: Ear canal and external ear normal.      Left Ear: Ear canal and external ear normal.      Nose: Nose normal.      Mouth/Throat:      Mouth: Mucous membranes are moist.      Pharynx: Oropharynx is clear.   Eyes:      Conjunctiva/sclera: Conjunctivae normal.      Pupils: Pupils are equal, round, and reactive to light.   Cardiovascular:      Rate and Rhythm: Normal rate.   Pulmonary:      Effort: Pulmonary effort is normal. No respiratory distress.   Musculoskeletal:      Cervical back: Normal range of motion and neck supple.      Lumbar  back: Tenderness present. Normal range of motion.   Skin:     General: Skin is warm and dry.   Neurological:      Mental Status: She is alert and oriented to person, place, and time.      Sensory: Sensory deficit (stocking glove loss of sensation in feet) present.      Motor: Motor function is intact.      Coordination: Coordination is intact.      Gait: Gait is intact.   Psychiatric:         Mood and Affect: Mood normal.         Thought Content: Thought content normal.         Assessment/Plan   Problem List Items Addressed This Visit           ICD-10-CM    Spinal stenosis, lumbar region without neurogenic claudication M48.061    Chemotherapy-induced neuropathy (Multi) - Primary G62.0, T45.1X5A    Relevant Medications    suzetrigine 50 mg tablet     Other Visit Diagnoses         Codes      Other chronic pain     G89.29          I nice discussion with the patient today our plan will be as follows.    Radiology: All imaging was reviewed today.     Physically:  Patient to continue to exercises and PT exercises as well as walking as tolerated.      Psychologically:  Continue with good mental health activities    Medication: Patient will start Journavx 50 mg.  We will start with the 100 mg loading dose then 50 mg tablet every 12 hours for total 14 days.    Patient to continue with Metanx BID.  Duration:  > 1 year    Intervention:  Patient scheduled for Scrambler therapy up coming.          Please note that this report has been produced using speech recognition software. It may contain errors related to grammar, punctuation or spelling. Electronically signed, but not reviewed.          Hilario Vega MD 07/10/25 2:08 PM

## 2025-07-14 ENCOUNTER — TELEPHONE (OUTPATIENT)
Facility: CLINIC | Age: 70
End: 2025-07-14
Payer: COMMERCIAL

## 2025-07-14 DIAGNOSIS — T45.1X5A CHEMOTHERAPY-INDUCED NEUROPATHY (MULTI): ICD-10-CM

## 2025-07-14 DIAGNOSIS — G62.0 CHEMOTHERAPY-INDUCED NEUROPATHY (MULTI): ICD-10-CM

## 2025-07-14 NOTE — TELEPHONE ENCOUNTER
Pt calls stating she needs a letter of medical necessity for her Metanx. She gave a phone # to call: [facility] [phone].   Noted pt has [facility] and pharmacy is listed as [facility]. Asked her to call back for for more information about her pharmacy.

## 2025-07-14 NOTE — TELEPHONE ENCOUNTER
Called [facility] and they state if the Metanx prescription is changed to   #180 for 45 days the cost for the patient would be: $71.33 for first 30 days, $65.33 for second 30 days and $65.33 for last 30 days. She would receive all #180 pills and pay $71.33 up front and the rest as above.   Call to patient and let her know this and also asked her to give us her prescription card information so we could do a prior authorization. Awaiting for her call.

## 2025-07-21 RX ORDER — ALCOHOL 2.38 KG/3.79L
1 GEL TOPICAL 2 TIMES DAILY
Qty: 90 CAPSULE | Refills: 2 | Status: CANCELLED | OUTPATIENT
Start: 2025-07-21 | End: 2025-08-20

## 2025-07-22 DIAGNOSIS — T45.1X5A CHEMOTHERAPY-INDUCED NEUROPATHY (MULTI): ICD-10-CM

## 2025-07-22 DIAGNOSIS — G62.0 CHEMOTHERAPY-INDUCED NEUROPATHY (MULTI): ICD-10-CM

## 2025-07-22 RX ORDER — ALCOHOL 2.38 KG/3.79L
1 GEL TOPICAL 2 TIMES DAILY
Qty: 180 CAPSULE | Refills: 0 | Status: SHIPPED | OUTPATIENT
Start: 2025-07-22 | End: 2025-10-20

## 2025-07-24 ENCOUNTER — OFFICE VISIT (OUTPATIENT)
Dept: URGENT CARE | Age: 70
End: 2025-07-24
Payer: COMMERCIAL

## 2025-07-24 VITALS
HEIGHT: 65 IN | BODY MASS INDEX: 27.49 KG/M2 | WEIGHT: 165 LBS | TEMPERATURE: 98.2 F | OXYGEN SATURATION: 94 % | RESPIRATION RATE: 22 BRPM | HEART RATE: 74 BPM

## 2025-07-24 DIAGNOSIS — F41.1 GENERALIZED ANXIETY DISORDER: Primary | ICD-10-CM

## 2025-07-24 DIAGNOSIS — Q24.8 LEFT VENTRICULAR OUTFLOW TRACT OBSTRUCTION (HHS-HCC): ICD-10-CM

## 2025-07-24 DIAGNOSIS — R01.1 HEART MURMUR: ICD-10-CM

## 2025-07-24 DIAGNOSIS — D64.9 ACUTE ANEMIA: ICD-10-CM

## 2025-07-24 DIAGNOSIS — G89.4 CHRONIC PAIN SYNDROME: ICD-10-CM

## 2025-07-24 RX ORDER — SODIUM CHLORIDE 1000 MG
600 TABLET, SOLUBLE MISCELLANEOUS 2 TIMES DAILY
COMMUNITY

## 2025-07-24 ASSESSMENT — ENCOUNTER SYMPTOMS
ANAL BLEEDING: 0
SLEEP DISTURBANCE: 1
BLOOD IN STOOL: 0
NERVOUS/ANXIOUS: 1
FATIGUE: 1
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
## 2025-07-31 ENCOUNTER — APPOINTMENT (OUTPATIENT)
Dept: INTEGRATIVE MEDICINE | Facility: CLINIC | Age: 70
End: 2025-07-31

## 2025-08-04 ENCOUNTER — OFFICE VISIT (OUTPATIENT)
Dept: PAIN MEDICINE | Facility: CLINIC | Age: 70
End: 2025-08-04
Payer: COMMERCIAL

## 2025-08-04 VITALS
HEART RATE: 78 BPM | HEIGHT: 65 IN | SYSTOLIC BLOOD PRESSURE: 112 MMHG | BODY MASS INDEX: 27.49 KG/M2 | OXYGEN SATURATION: 97 % | DIASTOLIC BLOOD PRESSURE: 72 MMHG | WEIGHT: 165 LBS

## 2025-08-04 DIAGNOSIS — G62.0 CHEMOTHERAPY-INDUCED NEUROPATHY (MULTI): Primary | ICD-10-CM

## 2025-08-04 DIAGNOSIS — T45.1X5A CHEMOTHERAPY-INDUCED NEUROPATHY (MULTI): Primary | ICD-10-CM

## 2025-08-04 PROBLEM — R05.3 CHRONIC COUGH: Status: RESOLVED | Noted: 2023-10-02 | Resolved: 2025-08-04

## 2025-08-04 PROBLEM — S90.31XA CONTUSION OF RIGHT FOOT: Status: RESOLVED | Noted: 2023-10-02 | Resolved: 2025-08-04

## 2025-08-04 PROBLEM — M48.061 SPINAL STENOSIS, LUMBAR REGION WITHOUT NEUROGENIC CLAUDICATION: Status: RESOLVED | Noted: 2024-06-06 | Resolved: 2025-08-04

## 2025-08-04 PROBLEM — J02.9 SORE THROAT: Status: RESOLVED | Noted: 2024-04-04 | Resolved: 2025-08-04

## 2025-08-04 PROBLEM — F41.9 ANXIETY AND DEPRESSION: Status: RESOLVED | Noted: 2023-10-02 | Resolved: 2025-08-04

## 2025-08-04 PROBLEM — F51.04 PSYCHOPHYSIOLOGICAL INSOMNIA: Status: RESOLVED | Noted: 2023-10-02 | Resolved: 2025-08-04

## 2025-08-04 PROBLEM — M72.2 PLANTAR FASCIITIS: Status: RESOLVED | Noted: 2023-10-02 | Resolved: 2025-08-04

## 2025-08-04 PROBLEM — M72.2 PLANTAR FASCIITIS, BILATERAL: Status: RESOLVED | Noted: 2023-10-06 | Resolved: 2025-08-04

## 2025-08-04 PROBLEM — F32.A ANXIETY AND DEPRESSION: Status: RESOLVED | Noted: 2023-10-02 | Resolved: 2025-08-04

## 2025-08-04 PROBLEM — D18.01 HEMANGIOMA OF SKIN AND SUBCUTANEOUS TISSUE: Status: RESOLVED | Noted: 2019-07-08 | Resolved: 2025-08-04

## 2025-08-04 PROBLEM — D22.5 MELANOCYTIC NEVI OF TRUNK: Status: RESOLVED | Noted: 2019-07-08 | Resolved: 2025-08-04

## 2025-08-04 PROBLEM — R03.0 ELEVATED BLOOD PRESSURE READING: Status: RESOLVED | Noted: 2023-10-02 | Resolved: 2025-08-04

## 2025-08-04 PROBLEM — J18.9 PNEUMONIA OF LEFT LOWER LOBE DUE TO INFECTIOUS ORGANISM: Status: RESOLVED | Noted: 2023-10-02 | Resolved: 2025-08-04

## 2025-08-04 PROBLEM — M48.062 SPINAL STENOSIS, LUMBAR REGION WITH NEUROGENIC CLAUDICATION: Status: ACTIVE | Noted: 2023-12-08

## 2025-08-04 PROBLEM — D22.39 MELANOCYTIC NEVI OF OTHER PARTS OF FACE: Status: RESOLVED | Noted: 2019-07-08 | Resolved: 2025-08-04

## 2025-08-04 PROBLEM — R50.9 FEVER: Status: RESOLVED | Noted: 2023-10-02 | Resolved: 2025-08-04

## 2025-08-04 PROBLEM — S76.301A RIGHT HAMSTRING INJURY: Status: RESOLVED | Noted: 2023-10-02 | Resolved: 2025-08-04

## 2025-08-04 PROBLEM — M21.611 BUNION, RIGHT FOOT: Status: ACTIVE | Noted: 2025-06-26

## 2025-08-04 PROCEDURE — 99215 OFFICE O/P EST HI 40 MIN: CPT | Performed by: PHYSICIAN ASSISTANT

## 2025-08-04 PROCEDURE — G2212 PROLONG OUTPT/OFFICE VIS: HCPCS | Performed by: PHYSICIAN ASSISTANT

## 2025-08-04 PROCEDURE — 3008F BODY MASS INDEX DOCD: CPT | Performed by: PHYSICIAN ASSISTANT

## 2025-08-04 PROCEDURE — 99417 PROLNG OP E/M EACH 15 MIN: CPT | Performed by: PHYSICIAN ASSISTANT

## 2025-08-04 PROCEDURE — 1159F MED LIST DOCD IN RCRD: CPT | Performed by: PHYSICIAN ASSISTANT

## 2025-08-04 PROCEDURE — 1036F TOBACCO NON-USER: CPT | Performed by: PHYSICIAN ASSISTANT

## 2025-08-04 RX ORDER — BUTYROSPERMUM PARKII(SHEA BUTTER), SIMMONDSIA CHINENSIS (JOJOBA) SEED OIL, ALOE BARBADENSIS LEAF EXTRACT .01; 1; 3.5 G/100G; G/100G; G/100G
250 LIQUID TOPICAL 2 TIMES DAILY
COMMUNITY

## 2025-08-04 ASSESSMENT — PAIN SCALES - GENERAL
PAINLEVEL_OUTOF10: 8
PAINLEVEL_OUTOF10: 8

## 2025-08-04 ASSESSMENT — PAIN - FUNCTIONAL ASSESSMENT: PAIN_FUNCTIONAL_ASSESSMENT: 0-10

## 2025-08-04 NOTE — PROGRESS NOTES
"TREATMENT PLAN:  Day 1 of Calmare Therapy.   Verbal consent obtained.  Session initiated by myself with proper electrode/lead placement to applicable dermatomes.   Patient tolerated treatment well (see \"procedure note\" below).  Encouraged patient to keep a diary or log of symptoms to monitor for any changes in pain, sensation, etc.       /72   Pulse 78   Ht 1.651 m (5' 5\")   Wt 74.8 kg (165 lb)   LMP  (LMP Unknown)   SpO2 97%   BMI 27.46 kg/m²     Medial aspect of both legs, tightness and swelling on right ankle    Bottom of foot - burning, tingling and numbness         PROCEDURE NOTE:  Treatment 1 with Calmare therapy was initiated by myself.   Verbal consent obtained from patient.  Therapy start time: 9:50 AM    Leads were applied to:  RLE:  Base of great toe and ball of foot (L5); therapeutic dose 1600.  Lateral side of foot and posterior calf (S1); therapeutic dose 1600.  Lateral right thigh and lateral calf (L5); therapeutic dose 1100    LLE:  Base of great toe and ball of foot (L5); therapeutic dose .  Lateral side of foot and lateral posterior calf (S1); therapeutic dose .      Therapy end time: 10:45 AM     Patient tolerated treatment well.  Pain reported at start of therapy session was about a 8/10.  Post-session pain score: 8/10    Leads were removed and patient left the office without any difficulty. Pt was discharged home in stable condition.    Patient will return tomorrow for additional treatment.   60 min total scrambler treatment time.      Minna Mackey PA-C  Anesthesiologist & Interventional Pain Physician   [facility] [place]  O: [phone]  F: [phone]  9:39 AM  08/04/25    "

## 2025-08-05 ENCOUNTER — TELEPHONE (OUTPATIENT)
Dept: PAIN MEDICINE | Facility: CLINIC | Age: 70
End: 2025-08-05
Payer: COMMERCIAL

## 2025-08-05 ENCOUNTER — OFFICE VISIT (OUTPATIENT)
Dept: PAIN MEDICINE | Facility: CLINIC | Age: 70
End: 2025-08-05
Payer: COMMERCIAL

## 2025-08-05 VITALS — HEART RATE: 77 BPM | BODY MASS INDEX: 27.49 KG/M2 | OXYGEN SATURATION: 97 % | HEIGHT: 65 IN | WEIGHT: 165 LBS

## 2025-08-05 DIAGNOSIS — G62.0 CHEMOTHERAPY-INDUCED NEUROPATHY (MULTI): Primary | ICD-10-CM

## 2025-08-05 DIAGNOSIS — T45.1X5A CHEMOTHERAPY-INDUCED NEUROPATHY (MULTI): Primary | ICD-10-CM

## 2025-08-05 PROCEDURE — 99215 OFFICE O/P EST HI 40 MIN: CPT | Performed by: PHYSICIAN ASSISTANT

## 2025-08-05 PROCEDURE — 1036F TOBACCO NON-USER: CPT | Performed by: PHYSICIAN ASSISTANT

## 2025-08-05 PROCEDURE — 1125F AMNT PAIN NOTED PAIN PRSNT: CPT | Performed by: PHYSICIAN ASSISTANT

## 2025-08-05 PROCEDURE — 3008F BODY MASS INDEX DOCD: CPT | Performed by: PHYSICIAN ASSISTANT

## 2025-08-05 PROCEDURE — G2212 PROLONG OUTPT/OFFICE VIS: HCPCS | Performed by: PHYSICIAN ASSISTANT

## 2025-08-05 PROCEDURE — 1159F MED LIST DOCD IN RCRD: CPT | Performed by: PHYSICIAN ASSISTANT

## 2025-08-05 ASSESSMENT — PAIN SCALES - GENERAL
PAINLEVEL_OUTOF10: 5 - MODERATE PAIN
PAINLEVEL_OUTOF10: 5

## 2025-08-05 ASSESSMENT — PAIN DESCRIPTION - DESCRIPTORS: DESCRIPTORS: ACHING;STABBING

## 2025-08-05 NOTE — PROGRESS NOTES
"TREATMENT PLAN:  Day 2 of Calmare Therapy.   Verbal consent obtained.  Session initiated by myself with proper electrode/lead placement to applicable dermatomes.   Patient tolerated treatment well (see \"procedure note\" below).  Encouraged patient to keep a diary or log of symptoms to monitor for any changes in pain, sensation, etc.       Pulse 77   Ht 1.651 m (5' 5\")   Wt 74.8 kg (165 lb)   LMP  (LMP Unknown)   SpO2 97%   BMI 27.46 kg/m²       **no tingling today       PROCEDURE NOTE:  Treatment 2 with Calmare therapy was initiated by myself.   Verbal consent obtained from patient.  Therapy start time: 9:44 AM    Leads were applied to:  RLE:  Base of great toe and ball of foot (L5); therapeutic dose 1500.  Lateral side of foot and posterior calf (S1); therapeutic dose 1500.  Lateral and posterior right thigh; therapeutic dose 1500    LLE:  Base of great toe and ball of foot (L5); therapeutic dose 1500.  Lateral side of foot and lateral posterior calf (S1); therapeutic dose 1530.    Therapy end time: 10:39    Patient tolerated treatment well.  Pain reported at start of therapy session was about a 5/10.  Post-session pain score: 5/10    Leads were removed and patient left the office without any difficulty. Pt was discharged home in stable condition.    Patient will return tomorrow for additional treatment.   60 min total scrambler treatment time.      Minna Mackey PA-C  Anesthesiologist & Interventional Pain Physician   [facility] [place]  O: [phone]  F: [phone]  9:37 AM  08/05/25    "

## 2025-08-05 NOTE — TELEPHONE ENCOUNTER
Pt asked if it would be possible for the office to appeal her medication Metanx.     Why your request was denied:  *Drug Not Covered/Plan Exclusion - Your request for coverage was denied because your  prescription benefit plan does not cover the requested medication    · Appeal the decision  If you don’t agree with this decision, you, your doctor or your authorized representative have  the right to ask for an appeal.  The appeal request should be in writing and show why you or your doctor thinks this medication  needs to be covered for you. Examples of documents you can include are a letter from your  doctor, clinical notes, or test results.  If your situation is urgent, you can ask for an expedited appeal by phone or in writing. Be sure  to clearly reynold any appeal documents as “urgent” when you send them.    Send your appeal to:  Prescription Claim Appeals [address]  [address] [address]  [place] [address]  Fax: [phone]  Urgent Fax (if applicable): 1-    Please advise

## 2025-08-06 ENCOUNTER — OFFICE VISIT (OUTPATIENT)
Dept: PAIN MEDICINE | Facility: CLINIC | Age: 70
End: 2025-08-06
Payer: COMMERCIAL

## 2025-08-06 VITALS — HEIGHT: 65 IN | HEART RATE: 74 BPM | OXYGEN SATURATION: 96 % | BODY MASS INDEX: 27.49 KG/M2 | WEIGHT: 165 LBS

## 2025-08-06 DIAGNOSIS — T45.1X5A CHEMOTHERAPY-INDUCED NEUROPATHY (MULTI): Primary | ICD-10-CM

## 2025-08-06 DIAGNOSIS — G62.0 CHEMOTHERAPY-INDUCED NEUROPATHY (MULTI): Primary | ICD-10-CM

## 2025-08-06 PROCEDURE — 1036F TOBACCO NON-USER: CPT | Performed by: PHYSICIAN ASSISTANT

## 2025-08-06 PROCEDURE — 99215 OFFICE O/P EST HI 40 MIN: CPT | Performed by: PHYSICIAN ASSISTANT

## 2025-08-06 PROCEDURE — G2212 PROLONG OUTPT/OFFICE VIS: HCPCS | Performed by: PHYSICIAN ASSISTANT

## 2025-08-06 PROCEDURE — 1159F MED LIST DOCD IN RCRD: CPT | Performed by: PHYSICIAN ASSISTANT

## 2025-08-06 PROCEDURE — 3008F BODY MASS INDEX DOCD: CPT | Performed by: PHYSICIAN ASSISTANT

## 2025-08-06 NOTE — PROGRESS NOTES
"TREATMENT PLAN:  Day 3 of Calmare Therapy.   Verbal consent obtained.  Session initiated by myself with proper electrode/lead placement to applicable dermatomes.   Patient tolerated treatment well (see \"procedure note\" below).  Encouraged patient to keep a diary or log of symptoms to monitor for any changes in pain, sensation, etc.       Pulse 74   Ht 1.651 m (5' 5\")   Wt 74.8 kg (165 lb)   LMP  (LMP Unknown)   SpO2 96%   BMI 27.46 kg/m²       PROCEDURE NOTE:  Treatment 3 with Calmare therapy was initiated by myself.   Verbal consent obtained from patient.  Therapy start time: 9:49 AM    Leads were applied to:  RLE:  Base of great toe and ball of foot (L5); therapeutic dose 1200.  Lateral side of foot and posterior calf (S1); therapeutic dose 1400.  Posterior thigh and lateral 1200    LLE:  Base of great toe and ball of foot (L5); therapeutic dose 1600.  Lateral side of foot and lateral posterior calf (S1); therapeutic dose 1500.      Therapy end time: 10:44 AM    Patient tolerated treatment well.  Pain reported at start of therapy session was about a 5/10.  Post-session pain score: 5/10    Leads were removed and patient left the office without any difficulty. Pt was discharged home in stable condition.    Patient will return tomorrow for additional treatment.   60 min total scrambler treatment time.      Minna Mackey PA-C  Anesthesiologist & Interventional Pain Physician   [facility] [place]  O: [phone]  F: [phone]  9:46 AM  08/06/25    "

## 2025-08-07 ENCOUNTER — OFFICE VISIT (OUTPATIENT)
Dept: PAIN MEDICINE | Facility: CLINIC | Age: 70
End: 2025-08-07
Payer: COMMERCIAL

## 2025-08-07 VITALS — SYSTOLIC BLOOD PRESSURE: 124 MMHG | DIASTOLIC BLOOD PRESSURE: 70 MMHG | HEART RATE: 71 BPM | OXYGEN SATURATION: 99 %

## 2025-08-07 DIAGNOSIS — T45.1X5A CHEMOTHERAPY-INDUCED NEUROPATHY (MULTI): Primary | ICD-10-CM

## 2025-08-07 DIAGNOSIS — G62.0 CHEMOTHERAPY-INDUCED NEUROPATHY (MULTI): Primary | ICD-10-CM

## 2025-08-07 ASSESSMENT — PAIN SCALES - GENERAL
PAINLEVEL_OUTOF10: 7
PAINLEVEL_OUTOF10: 7

## 2025-08-07 ASSESSMENT — PATIENT HEALTH QUESTIONNAIRE - PHQ9
1. LITTLE INTEREST OR PLEASURE IN DOING THINGS: NOT AT ALL
SUM OF ALL RESPONSES TO PHQ9 QUESTIONS 1 AND 2: 0
2. FEELING DOWN, DEPRESSED OR HOPELESS: NOT AT ALL

## 2025-08-07 ASSESSMENT — PAIN DESCRIPTION - DESCRIPTORS: DESCRIPTORS: ACHING;STABBING

## 2025-08-07 ASSESSMENT — ENCOUNTER SYMPTOMS
OCCASIONAL FEELINGS OF UNSTEADINESS: 0
LOSS OF SENSATION IN FEET: 0
DEPRESSION: 0

## 2025-08-07 NOTE — PROGRESS NOTES
"TREATMENT PLAN:  Day 4 of Calmare Therapy.   Verbal consent obtained.  Session initiated by myself with proper electrode/lead placement to applicable dermatomes.   Patient tolerated treatment well (see \"procedure note\" below).  Encouraged patient to keep a diary or log of symptoms to monitor for any changes in pain, sensation, etc.       /70   Pulse 71   LMP  (LMP Unknown)   SpO2 99%       Medial aspect of R>L leg today, increased pain last night,   Bottom of foot   Burning, tingling and numbness    PROCEDURE NOTE:  Treatment 4 with Calmare therapy was initiated by myself.   Verbal consent obtained from patient.  Therapy start time: 9:38 AM    Leads were applied to:  RLE:  Base of great toe and ball of foot (L5); therapeutic dose 1200.  Lateral side of foot and posterior calf (S1); therapeutic dose 1400.  Medial malleolus and anterior shin (L4); therapeutic dose 1200    LLE:  Base of great toe and ball of foot (L5); therapeutic dose 1600.  Lateral side of foot and lateral posterior calf (S1); therapeutic dose 1500.      Therapy end time: 10:33 AM    Patient tolerated treatment well.  Pain reported at start of therapy session was about a 7/10.  Post-session pain score: 7/10    Leads were removed and patient left the office without any difficulty. Pt was discharged home in stable condition.    Patient will return tomorrow for additional treatment.   60 min total scrambler treatment time.      Minna Mackey PA-C  Anesthesiologist & Interventional Pain Physician   [facility] [place]  O: [phone]  F: [phone]  9:31 AM  08/07/25    "

## 2025-08-08 ENCOUNTER — OFFICE VISIT (OUTPATIENT)
Dept: PAIN MEDICINE | Facility: CLINIC | Age: 70
End: 2025-08-08
Payer: COMMERCIAL

## 2025-08-08 VITALS — HEART RATE: 81 BPM | OXYGEN SATURATION: 99 % | SYSTOLIC BLOOD PRESSURE: 124 MMHG | DIASTOLIC BLOOD PRESSURE: 80 MMHG

## 2025-08-08 DIAGNOSIS — G62.0 CHEMOTHERAPY-INDUCED NEUROPATHY (MULTI): Primary | ICD-10-CM

## 2025-08-08 DIAGNOSIS — T45.1X5A CHEMOTHERAPY-INDUCED NEUROPATHY (MULTI): Primary | ICD-10-CM

## 2025-08-08 ASSESSMENT — ENCOUNTER SYMPTOMS
LOSS OF SENSATION IN FEET: 0
DEPRESSION: 0
OCCASIONAL FEELINGS OF UNSTEADINESS: 0

## 2025-08-08 ASSESSMENT — PAIN SCALES - GENERAL
PAINLEVEL_OUTOF10: 4
PAINLEVEL_OUTOF10: 4

## 2025-08-08 ASSESSMENT — PAIN DESCRIPTION - DESCRIPTORS: DESCRIPTORS: ACHING;STABBING

## 2025-08-08 NOTE — PROGRESS NOTES
"TREATMENT PLAN:  Day 5 of Calmare Therapy.   Verbal consent obtained.  Session initiated by myself with proper electrode/lead placement to applicable dermatomes.   Patient tolerated treatment well (see \"procedure note\" below).  Encouraged patient to keep a diary or log of symptoms to monitor for any changes in pain, sensation, etc.       /80   Pulse 81   LMP  (LMP Unknown)   SpO2 99%     Feels tightness in both lower legs, bottom foot on left     PROCEDURE NOTE:  Treatment 5 with Calmare therapy was initiated by myself.   Verbal consent obtained from patient.  Therapy start time: 9:56 AM    Leads were applied to:  RLE:  Base of great toe and ball of foot (L5); therapeutic dose 1530.  Lateral side of foot and posterior calf (S1); therapeutic dose 1300.      LLE:  Base of great toe and ball of foot (L5); therapeutic dose 1400.  Lateral side of foot and lateral posterior calf (S1); therapeutic dose 1400.  Medial malleolus and anterior shin (L4); therapeutic dose 1400    Therapy end time: 10:51 AM    Patient tolerated treatment well.  Pain reported at start of therapy session was about a 4/10.  Post-session pain score: 4/10    Leads were removed and patient left the office without any difficulty. Pt was discharged home in stable condition.    Patient will return next week for additional treatment.   60 min total scrambler treatment time.      Minna Mackey PA-C  Anesthesiologist & Interventional Pain Physician   [facility] [place]  O: [phone]  F: [phone]  9:49 AM  08/08/25    "

## 2025-08-11 ENCOUNTER — OFFICE VISIT (OUTPATIENT)
Dept: PAIN MEDICINE | Facility: CLINIC | Age: 70
End: 2025-08-11
Payer: COMMERCIAL

## 2025-08-11 VITALS — HEART RATE: 79 BPM | OXYGEN SATURATION: 97 % | DIASTOLIC BLOOD PRESSURE: 70 MMHG | SYSTOLIC BLOOD PRESSURE: 124 MMHG

## 2025-08-11 DIAGNOSIS — T45.1X5A CHEMOTHERAPY-INDUCED NEUROPATHY (MULTI): Primary | ICD-10-CM

## 2025-08-11 DIAGNOSIS — G62.0 CHEMOTHERAPY-INDUCED NEUROPATHY (MULTI): Primary | ICD-10-CM

## 2025-08-11 PROCEDURE — 1159F MED LIST DOCD IN RCRD: CPT | Performed by: PHYSICIAN ASSISTANT

## 2025-08-11 PROCEDURE — 1125F AMNT PAIN NOTED PAIN PRSNT: CPT | Performed by: PHYSICIAN ASSISTANT

## 2025-08-11 PROCEDURE — 1036F TOBACCO NON-USER: CPT | Performed by: PHYSICIAN ASSISTANT

## 2025-08-11 PROCEDURE — 99215 OFFICE O/P EST HI 40 MIN: CPT | Performed by: PHYSICIAN ASSISTANT

## 2025-08-11 PROCEDURE — G2212 PROLONG OUTPT/OFFICE VIS: HCPCS | Performed by: PHYSICIAN ASSISTANT

## 2025-08-11 PROCEDURE — 99214 OFFICE O/P EST MOD 30 MIN: CPT

## 2025-08-11 RX ORDER — ACETAMINOPHEN 325 MG/1
TABLET ORAL
COMMUNITY
Start: 2025-07-05

## 2025-08-11 ASSESSMENT — ENCOUNTER SYMPTOMS
DEPRESSION: 0
OCCASIONAL FEELINGS OF UNSTEADINESS: 0
LOSS OF SENSATION IN FEET: 0

## 2025-08-11 ASSESSMENT — PAIN SCALES - GENERAL
PAINLEVEL_OUTOF10: 5 - MODERATE PAIN
PAINLEVEL_OUTOF10: 5

## 2025-08-11 ASSESSMENT — PAIN DESCRIPTION - DESCRIPTORS: DESCRIPTORS: ACHING;STABBING

## 2025-08-12 ENCOUNTER — OFFICE VISIT (OUTPATIENT)
Dept: PAIN MEDICINE | Facility: CLINIC | Age: 70
End: 2025-08-12
Payer: COMMERCIAL

## 2025-08-12 VITALS — WEIGHT: 165 LBS | HEIGHT: 65 IN | BODY MASS INDEX: 27.49 KG/M2

## 2025-08-12 DIAGNOSIS — G62.0 CHEMOTHERAPY-INDUCED NEUROPATHY (MULTI): Primary | ICD-10-CM

## 2025-08-12 DIAGNOSIS — T45.1X5A CHEMOTHERAPY-INDUCED NEUROPATHY (MULTI): Primary | ICD-10-CM

## 2025-08-12 PROCEDURE — 99215 OFFICE O/P EST HI 40 MIN: CPT | Performed by: PHYSICIAN ASSISTANT

## 2025-08-12 PROCEDURE — G2212 PROLONG OUTPT/OFFICE VIS: HCPCS | Performed by: PHYSICIAN ASSISTANT

## 2025-08-12 PROCEDURE — 1036F TOBACCO NON-USER: CPT | Performed by: PHYSICIAN ASSISTANT

## 2025-08-12 PROCEDURE — 1125F AMNT PAIN NOTED PAIN PRSNT: CPT | Performed by: PHYSICIAN ASSISTANT

## 2025-08-12 PROCEDURE — 1159F MED LIST DOCD IN RCRD: CPT | Performed by: PHYSICIAN ASSISTANT

## 2025-08-12 PROCEDURE — 3008F BODY MASS INDEX DOCD: CPT | Performed by: PHYSICIAN ASSISTANT

## 2025-08-12 PROCEDURE — 99214 OFFICE O/P EST MOD 30 MIN: CPT

## 2025-08-12 ASSESSMENT — PAIN SCALES - GENERAL: PAINLEVEL_OUTOF10: 5 - MODERATE PAIN

## 2025-08-13 ENCOUNTER — OFFICE VISIT (OUTPATIENT)
Dept: PAIN MEDICINE | Facility: CLINIC | Age: 70
End: 2025-08-13
Payer: COMMERCIAL

## 2025-08-13 VITALS — SYSTOLIC BLOOD PRESSURE: 112 MMHG | DIASTOLIC BLOOD PRESSURE: 60 MMHG | HEART RATE: 77 BPM | OXYGEN SATURATION: 99 %

## 2025-08-13 DIAGNOSIS — T45.1X5A CHEMOTHERAPY-INDUCED NEUROPATHY (MULTI): Primary | ICD-10-CM

## 2025-08-13 DIAGNOSIS — G62.0 CHEMOTHERAPY-INDUCED NEUROPATHY (MULTI): Primary | ICD-10-CM

## 2025-08-13 PROCEDURE — 1159F MED LIST DOCD IN RCRD: CPT | Performed by: PHYSICIAN ASSISTANT

## 2025-08-13 PROCEDURE — 99215 OFFICE O/P EST HI 40 MIN: CPT | Performed by: PHYSICIAN ASSISTANT

## 2025-08-13 PROCEDURE — G2212 PROLONG OUTPT/OFFICE VIS: HCPCS | Performed by: PHYSICIAN ASSISTANT

## 2025-08-13 PROCEDURE — 1125F AMNT PAIN NOTED PAIN PRSNT: CPT | Performed by: PHYSICIAN ASSISTANT

## 2025-08-13 PROCEDURE — 99214 OFFICE O/P EST MOD 30 MIN: CPT

## 2025-08-13 ASSESSMENT — ENCOUNTER SYMPTOMS
LOSS OF SENSATION IN FEET: 0
DEPRESSION: 0
OCCASIONAL FEELINGS OF UNSTEADINESS: 0

## 2025-08-13 ASSESSMENT — PAIN SCALES - GENERAL
PAINLEVEL_OUTOF10: 3
PAINLEVEL_OUTOF10: 3

## 2025-08-13 ASSESSMENT — PAIN DESCRIPTION - DESCRIPTORS: DESCRIPTORS: NUMBNESS;TINGLING;PRESSURE

## 2025-08-14 ENCOUNTER — OFFICE VISIT (OUTPATIENT)
Dept: PAIN MEDICINE | Facility: CLINIC | Age: 70
End: 2025-08-14
Payer: COMMERCIAL

## 2025-08-14 DIAGNOSIS — G62.0 CHEMOTHERAPY-INDUCED NEUROPATHY (MULTI): Primary | ICD-10-CM

## 2025-08-14 DIAGNOSIS — T45.1X5A CHEMOTHERAPY-INDUCED NEUROPATHY (MULTI): Primary | ICD-10-CM

## 2025-08-14 PROCEDURE — 99213 OFFICE O/P EST LOW 20 MIN: CPT

## 2025-08-14 ASSESSMENT — PAIN DESCRIPTION - DESCRIPTORS: DESCRIPTORS: ACHING

## 2025-08-14 ASSESSMENT — PAIN SCALES - GENERAL
PAINLEVEL_OUTOF10: 7
PAINLEVEL_OUTOF10: 7

## 2025-08-14 ASSESSMENT — PAIN - FUNCTIONAL ASSESSMENT: PAIN_FUNCTIONAL_ASSESSMENT: 0-10

## 2025-08-15 ENCOUNTER — OFFICE VISIT (OUTPATIENT)
Dept: PAIN MEDICINE | Facility: CLINIC | Age: 70
End: 2025-08-15
Payer: COMMERCIAL

## 2025-08-15 VITALS — HEIGHT: 65 IN | OXYGEN SATURATION: 96 % | HEART RATE: 72 BPM | WEIGHT: 165 LBS | BODY MASS INDEX: 27.49 KG/M2

## 2025-08-15 VITALS — BODY MASS INDEX: 27.49 KG/M2 | WEIGHT: 165 LBS | HEIGHT: 65 IN | HEART RATE: 76 BPM | OXYGEN SATURATION: 98 %

## 2025-08-15 DIAGNOSIS — G62.0 CHEMOTHERAPY-INDUCED NEUROPATHY (MULTI): Primary | ICD-10-CM

## 2025-08-15 DIAGNOSIS — T45.1X5A CHEMOTHERAPY-INDUCED NEUROPATHY (MULTI): Primary | ICD-10-CM

## 2025-08-15 PROCEDURE — 99214 OFFICE O/P EST MOD 30 MIN: CPT

## 2025-08-15 ASSESSMENT — PAIN SCALES - GENERAL
PAINLEVEL_OUTOF10: 2
PAINLEVEL_OUTOF10: 2

## 2025-08-15 ASSESSMENT — PATIENT HEALTH QUESTIONNAIRE - PHQ9
SUM OF ALL RESPONSES TO PHQ9 QUESTIONS 1 AND 2: 0
2. FEELING DOWN, DEPRESSED OR HOPELESS: NOT AT ALL
1. LITTLE INTEREST OR PLEASURE IN DOING THINGS: NOT AT ALL

## 2025-08-15 ASSESSMENT — PAIN DESCRIPTION - DESCRIPTORS: DESCRIPTORS: NUMBNESS;TINGLING

## 2025-08-21 ENCOUNTER — APPOINTMENT (OUTPATIENT)
Dept: INTEGRATIVE MEDICINE | Facility: CLINIC | Age: 70
End: 2025-08-21
Payer: COMMERCIAL

## 2025-08-24 PROBLEM — R29.818 NEUROGENIC CLAUDICATION: Status: ACTIVE | Noted: 2025-08-24

## 2025-08-24 RX ORDER — MIRTAZAPINE 15 MG/1
15 TABLET, FILM COATED ORAL NIGHTLY
COMMUNITY
Start: 2025-08-14

## 2025-08-24 RX ORDER — TRAZODONE HYDROCHLORIDE 50 MG/1
50 TABLET ORAL NIGHTLY
COMMUNITY
Start: 2025-07-24

## 2025-10-30 ENCOUNTER — APPOINTMENT (OUTPATIENT)
Dept: INTEGRATIVE MEDICINE | Facility: CLINIC | Age: 70
End: 2025-10-30
Payer: COMMERCIAL

## 2026-02-06 ENCOUNTER — APPOINTMENT (OUTPATIENT)
Dept: CARDIOLOGY | Facility: CLINIC | Age: 71
End: 2026-02-06
Payer: COMMERCIAL

## 2026-04-06 ENCOUNTER — APPOINTMENT (OUTPATIENT)
Dept: OPHTHALMOLOGY | Facility: CLINIC | Age: 71
End: 2026-04-06
Payer: COMMERCIAL